# Patient Record
Sex: MALE | Race: WHITE | Employment: UNEMPLOYED | URBAN - METROPOLITAN AREA
[De-identification: names, ages, dates, MRNs, and addresses within clinical notes are randomized per-mention and may not be internally consistent; named-entity substitution may affect disease eponyms.]

---

## 2018-04-24 ENCOUNTER — OFFICE VISIT (OUTPATIENT)
Dept: FAMILY MEDICINE CLINIC | Facility: CLINIC | Age: 43
End: 2018-04-24

## 2018-04-24 VITALS
HEART RATE: 80 BPM | DIASTOLIC BLOOD PRESSURE: 82 MMHG | HEIGHT: 74 IN | BODY MASS INDEX: 26.82 KG/M2 | SYSTOLIC BLOOD PRESSURE: 138 MMHG | WEIGHT: 209 LBS | RESPIRATION RATE: 18 BRPM
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
## 2018-04-24 DIAGNOSIS — S06.0X0A CONCUSSION WITHOUT LOSS OF CONSCIOUSNESS, INITIAL ENCOUNTER: ICD-10-CM

## 2018-04-24 DIAGNOSIS — R11.0 NAUSEA: ICD-10-CM

## 2018-04-24 DIAGNOSIS — S39.012A STRAIN OF LUMBAR REGION, INITIAL ENCOUNTER: ICD-10-CM

## 2018-04-24 DIAGNOSIS — R07.89 LEFT-SIDED CHEST WALL PAIN: Primary | ICD-10-CM

## 2018-04-24 DIAGNOSIS — S16.1XXA STRAIN OF NECK MUSCLE, INITIAL ENCOUNTER: ICD-10-CM

## 2018-04-24 DIAGNOSIS — S29.019A STRAIN OF THORACIC REGION, INITIAL ENCOUNTER: ICD-10-CM

## 2018-04-24 PROCEDURE — 99214 OFFICE O/P EST MOD 30 MIN: CPT | Performed by: FAMILY MEDICINE

## 2018-04-24 RX ORDER — METHYLPREDNISOLONE 4 MG/1
TABLET ORAL
Qty: 1 KIT | Refills: 0 | Status: SHIPPED | OUTPATIENT
Start: 2018-04-24 | End: 2018-06-08 | Stop reason: ALTCHOICE

## 2018-04-24 RX ORDER — ORPHENADRINE CITRATE 100 MG/1
TABLET, EXTENDED RELEASE ORAL
Qty: 20 TABLET | Refills: 0 | Status: SHIPPED | OUTPATIENT
Start: 2018-04-24 | End: 2018-06-08 | Stop reason: ALTCHOICE

## 2018-04-24 RX ORDER — ONDANSETRON 4 MG/1
4 TABLET, FILM COATED ORAL EVERY 8 HOURS PRN
Qty: 20 TABLET | Refills: 0 | Status: SHIPPED | OUTPATIENT
Start: 2018-04-24 | End: 2018-06-08 | Stop reason: ALTCHOICE

## 2018-04-24 RX ORDER — IBUPROFEN 200 MG
600 TABLET ORAL EVERY 6 HOURS PRN
COMMUNITY
End: 2018-06-08

## 2018-05-07 ENCOUNTER — TELEPHONE (OUTPATIENT)
Dept: FAMILY MEDICINE CLINIC | Facility: CLINIC | Age: 43
End: 2018-05-07

## 2018-05-07 DIAGNOSIS — S39.012D STRAIN OF LUMBAR REGION, SUBSEQUENT ENCOUNTER: Primary | ICD-10-CM

## 2018-05-07 RX ORDER — HYDROCODONE BITARTRATE AND ACETAMINOPHEN 5; 325 MG/1; MG/1
TABLET ORAL
Refills: 0 | COMMUNITY
Start: 2018-05-01 | End: 2018-06-08

## 2018-05-07 NOTE — TELEPHONE ENCOUNTER
Pt called and said his back spasms got bad over the weekend and it brought him down to 1 knee and now his back is completely out. He wants to know if he can get an order for an open MRI.
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
## 2018-05-08 NOTE — TELEPHONE ENCOUNTER
MRI of lumbar spine ordered  Future Appointments  Date Time Provider Lauren Faulkner   5/14/2018 10:30 AM JACOBK MR [facility] [place]

## 2018-05-09 ENCOUNTER — TELEPHONE (OUTPATIENT)
Dept: FAMILY MEDICINE CLINIC | Facility: CLINIC | Age: 43
End: 2018-05-09

## 2018-05-14 ENCOUNTER — HOSPITAL ENCOUNTER (OUTPATIENT)
Dept: MRI IMAGING | Age: 43
Discharge: HOME | End: 2018-05-14
Attending: FAMILY MEDICINE
Payer: COMMERCIAL

## 2018-05-14 DIAGNOSIS — S39.012D STRAIN OF LUMBAR REGION, SUBSEQUENT ENCOUNTER: ICD-10-CM

## 2018-05-14 PROCEDURE — 72148 MRI LUMBAR SPINE W/O DYE: CPT | Performed by: FAMILY MEDICINE

## 2018-05-14 NOTE — PROGRESS NOTES
Moderate degenerative changes at L4-5 and L5-S1 with small disc protrusions with L5-S1 approaching the S1 nerve root, refer patient to pain clinic. Sent to my chart please call patient with pain clinic information.

## 2018-05-15 ENCOUNTER — TELEPHONE (OUTPATIENT)
Dept: FAMILY MEDICINE CLINIC | Facility: CLINIC | Age: 43
End: 2018-05-15

## 2018-05-15 DIAGNOSIS — S39.012D STRAIN OF LUMBAR REGION, SUBSEQUENT ENCOUNTER: ICD-10-CM

## 2018-05-15 DIAGNOSIS — R93.89 ABNORMAL FINDING ON RADIOLOGY EXAM: Primary | ICD-10-CM

## 2018-05-15 NOTE — TELEPHONE ENCOUNTER
I did see on MRI he has a herniated disc and nerve impugnment I believe L5 S1 I unfortunately can not find the MRI report that I wrote the instructions on. I was referring him to pain clinic. Tell him I am sorry he was suppose to be called today.  Also kathy

## 2018-05-15 NOTE — TELEPHONE ENCOUNTER
Pt called and wants to know if he needs to come in to talk to Pratima Kirk about his MRI results. He also wants to know if Pratima Kirk can write him a script for massage therapy in [facility] by his home.

## 2018-05-16 NOTE — TELEPHONE ENCOUNTER
----- Message from Adriel Wagoner PA-C sent at 5/14/2018  5:17 PM CDT -----  Moderate degenerative changes at L4-5 and L5-S1 with small disc protrusions with L5-S1 approaching the S1 nerve root, refer patient to pain clinic.   Sent to my chart please gustabo

## 2018-05-16 NOTE — TELEPHONE ENCOUNTER
The patient was informed of his results and was given contact information for Pain Medicine Specialist, Moni Abernathy M.D. Jerrod Eastman

## 2018-05-17 ENCOUNTER — TELEPHONE (OUTPATIENT)
Dept: SURGERY | Facility: CLINIC | Age: 43
End: 2018-05-17

## 2018-05-17 NOTE — TELEPHONE ENCOUNTER
Pt scheduled NP appt 5/22 with Dr Cheyenne Coles ,referred by Declan Donaldson for strain of the lumbar region;pt is using [employer] will call with current [employer] information

## 2018-05-18 ENCOUNTER — TELEPHONE (OUTPATIENT)
Dept: FAMILY MEDICINE CLINIC | Facility: CLINIC | Age: 43
End: 2018-05-18
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
## 2018-05-18 NOTE — TELEPHONE ENCOUNTER
Pt states he was referred to Dr. Bree Ledezma for pain management but they do not accept third party liability insurance, pt would like to know if we have any other pain management doctors he can call around and see if they accept third party liability.
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
## 2018-05-30 ENCOUNTER — TELEPHONE (OUTPATIENT)
Dept: FAMILY MEDICINE CLINIC | Facility: CLINIC | Age: 43
End: 2018-05-30

## 2018-05-30 DIAGNOSIS — R93.89 ABNORMAL FINDING ON RADIOLOGY EXAM: Primary | ICD-10-CM

## 2018-05-30 DIAGNOSIS — S39.012D STRAIN OF LUMBAR REGION, SUBSEQUENT ENCOUNTER: ICD-10-CM

## 2018-05-30 NOTE — TELEPHONE ENCOUNTER
Winston Kraus is calling to let Jair Giron know that he is having a hard time finding a pain clinic that will take him for a third party payor, or accept his [employer], he is in a lot of pain and would like to see someone asap, he would like to know if [employer]

## 2018-05-30 NOTE — TELEPHONE ENCOUNTER
Hemalatha    1)  [id number] Lizet Polanco for the pain clinic referral to [facility]? 2)  Pt is asking for a MDP-please advise?

## 2018-05-30 NOTE — TELEPHONE ENCOUNTER
Sanya Dinero is calling back and he did find a place that will take his insurance, it is [facility] pain clinic, and they are requesting a referral from Rhys Allen and all of Catalino's imaging that was done, MRI, Cat Scan, please fax to [facility] clinic [phone] or

## 2018-05-31 RX ORDER — METHYLPREDNISOLONE 4 MG/1
TABLET ORAL
Qty: 1 KIT | Refills: 0 | Status: SHIPPED | OUTPATIENT
Start: 2018-05-31 | End: 2018-06-08 | Stop reason: ALTCHOICE

## 2018-05-31 NOTE — TELEPHONE ENCOUNTER
Ok for medrol dose italo   Thanks,   Rhys Allen (Routing comment     Medrol dose pack sent to pharmacy  Referral to [facility] [address] [place] [address] pain clinic and  MRI lumbar spine report faxed to [phone]  Left message for patient that if [facility] wants the actual images fro

## 2018-06-08 ENCOUNTER — OFFICE VISIT (OUTPATIENT)
Dept: FAMILY MEDICINE CLINIC | Facility: CLINIC | Age: 43
End: 2018-06-08

## 2018-06-08 VITALS
SYSTOLIC BLOOD PRESSURE: 102 MMHG | WEIGHT: 206 LBS | DIASTOLIC BLOOD PRESSURE: 80 MMHG | BODY MASS INDEX: 26 KG/M2 | HEART RATE: 60 BPM

## 2018-06-08 DIAGNOSIS — R07.89 LEFT-SIDED CHEST WALL PAIN: ICD-10-CM

## 2018-06-08 DIAGNOSIS — M54.14 THORACIC RADICULOPATHY: ICD-10-CM

## 2018-06-08 DIAGNOSIS — M51.16 LUMBAR DISC DISEASE WITH RADICULOPATHY: Primary | ICD-10-CM

## 2018-06-08 PROCEDURE — 99214 OFFICE O/P EST MOD 30 MIN: CPT | Performed by: FAMILY MEDICINE

## 2018-06-08 RX ORDER — HYDROCODONE BITARTRATE AND ACETAMINOPHEN 5; 325 MG/1; MG/1
1 TABLET ORAL NIGHTLY PRN
Qty: 20 TABLET | Refills: 0 | Status: SHIPPED | OUTPATIENT
Start: 2018-06-08 | End: 2018-07-10 | Stop reason: ALTCHOICE

## 2018-06-08 RX ORDER — METHYLPREDNISOLONE 4 MG/1
TABLET ORAL
Qty: 1 KIT | Refills: 0 | Status: SHIPPED | OUTPATIENT
Start: 2018-06-08 | End: 2018-07-10 | Stop reason: ALTCHOICE

## 2018-06-08 RX ORDER — OMEGA-3 FATTY ACIDS/FISH OIL 300-1000MG
CAPSULE ORAL
COMMUNITY
End: 2021-02-10

## 2018-06-08 RX ORDER — ORPHENADRINE CITRATE 100 MG/1
TABLET, EXTENDED RELEASE ORAL
Qty: 30 TABLET | Refills: 1 | Status: SHIPPED | OUTPATIENT
Start: 2018-06-08 | End: 2018-07-10 | Stop reason: ALTCHOICE
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
## 2018-06-09 PROBLEM — M51.16 LUMBAR DISC DISEASE WITH RADICULOPATHY: Status: ACTIVE | Noted: 2018-06-09

## 2018-06-09 PROBLEM — R07.89 LEFT-SIDED CHEST WALL PAIN: Status: ACTIVE | Noted: 2018-06-09

## 2018-06-09 PROBLEM — M54.14 THORACIC RADICULOPATHY: Status: ACTIVE | Noted: 2018-06-09

## 2018-06-20 ENCOUNTER — TELEPHONE (OUTPATIENT)
Dept: FAMILY MEDICINE CLINIC | Facility: CLINIC | Age: 43
End: 2018-06-20

## 2018-06-20 NOTE — TELEPHONE ENCOUNTER
Sridevi Gibson is calling to see if Plummer Cassette would be willing to prescribe him something for his aggitation, he did discuss this with her at his last visit, please send to pharmacy on file.

## 2018-06-21 RX ORDER — ALPRAZOLAM 0.25 MG/1
TABLET ORAL
Qty: 15 TABLET | Refills: 0 | OUTPATIENT
Start: 2018-06-21 | End: 2021-02-10 | Stop reason: SINTOL

## 2018-06-21 NOTE — TELEPHONE ENCOUNTER
We can do short term for occasional use ie extreme anxiety reaction or daily. If he decides on daily we need to discuss the side effects. ie Zoloft , Prozac.   Give him Xanax 0.25 mg take 1 eery 8 hours as needed for panic/anxiety #15 do not to drive, operat

## 2018-06-21 NOTE — TELEPHONE ENCOUNTER
Alprazolam med called to pharmacy. Once approved can discard paper copy. lmom for pt that this med is for occasional use. To please call the office to schedule an appt to come in to discuss a med for more long term use.

## 2018-07-03 ENCOUNTER — HOSPITAL ENCOUNTER (OUTPATIENT)
Dept: MRI IMAGING | Age: 43
Discharge: HOME | End: 2018-07-03
Attending: FAMILY MEDICINE
Payer: COMMERCIAL

## 2018-07-03 DIAGNOSIS — M54.14 THORACIC RADICULOPATHY: ICD-10-CM

## 2018-07-03 DIAGNOSIS — R07.89 LEFT-SIDED CHEST WALL PAIN: ICD-10-CM

## 2018-07-03 PROCEDURE — 72146 MRI CHEST SPINE W/O DYE: CPT | Performed by: FAMILY MEDICINE

## 2018-07-07 NOTE — PROGRESS NOTES
Please call patient get update on how he is doing the thoracic MRI spine is normal no signs of nerve impingement. I believe he was consulting with a pain clinic specialist we can forward these results if needed.   Sent to my chart but also please get updat

## 2018-07-10 ENCOUNTER — OFFICE VISIT (OUTPATIENT)
Dept: FAMILY MEDICINE CLINIC | Facility: CLINIC | Age: 43
End: 2018-07-10

## 2018-07-10 VITALS
WEIGHT: 209 LBS | DIASTOLIC BLOOD PRESSURE: 82 MMHG | HEIGHT: 74 IN | HEART RATE: 80 BPM | BODY MASS INDEX: 26.82 KG/M2 | SYSTOLIC BLOOD PRESSURE: 124 MMHG

## 2018-07-10 DIAGNOSIS — S16.1XXA STRAIN OF NECK MUSCLE, INITIAL ENCOUNTER: ICD-10-CM

## 2018-07-10 DIAGNOSIS — R07.81 RIB PAIN ON LEFT SIDE: ICD-10-CM

## 2018-07-10 DIAGNOSIS — R07.89 LEFT-SIDED CHEST WALL PAIN: ICD-10-CM

## 2018-07-10 DIAGNOSIS — F41.1 ANXIETY REACTION: ICD-10-CM

## 2018-07-10 DIAGNOSIS — M51.26 BULGING LUMBAR DISC: ICD-10-CM

## 2018-07-10 DIAGNOSIS — M51.16 LUMBAR DISC DISEASE WITH RADICULOPATHY: Primary | ICD-10-CM

## 2018-07-10 PROCEDURE — 99214 OFFICE O/P EST MOD 30 MIN: CPT | Performed by: FAMILY MEDICINE

## 2018-07-10 RX ORDER — AMITRIPTYLINE HYDROCHLORIDE 10 MG/1
1 TABLET, FILM COATED ORAL NIGHTLY
Refills: 1 | COMMUNITY
Start: 2018-06-24 | End: 2018-08-21 | Stop reason: ALTCHOICE

## 2018-07-10 RX ORDER — HYDROCODONE BITARTRATE AND ACETAMINOPHEN 5; 325 MG/1; MG/1
1 TABLET ORAL NIGHTLY PRN
Qty: 20 TABLET | Refills: 0 | Status: SHIPPED | OUTPATIENT
Start: 2018-07-10 | End: 2018-08-21 | Stop reason: ALTCHOICE

## 2018-07-10 RX ORDER — MELOXICAM 15 MG/1
TABLET ORAL
Refills: 1 | COMMUNITY
Start: 2018-06-24 | End: 2018-08-21 | Stop reason: ALTCHOICE

## 2018-07-10 RX ORDER — METHYLPREDNISOLONE 4 MG/1
TABLET ORAL
Qty: 1 KIT | Refills: 0 | Status: SHIPPED | OUTPATIENT
Start: 2018-07-10 | End: 2018-08-21 | Stop reason: ALTCHOICE

## 2018-07-10 RX ORDER — CLONAZEPAM 0.5 MG/1
0.25 TABLET ORAL 2 TIMES DAILY PRN
Qty: 15 TABLET | Refills: 0 | Status: SHIPPED | OUTPATIENT
Start: 2018-07-10 | End: 2021-02-10 | Stop reason: ALTCHOICE
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
## 2018-08-21 ENCOUNTER — OFFICE VISIT (OUTPATIENT)
Dept: FAMILY MEDICINE CLINIC | Facility: CLINIC | Age: 43
End: 2018-08-21
Payer: COMMERCIAL

## 2018-08-21 VITALS
HEART RATE: 80 BPM | BODY MASS INDEX: 26.95 KG/M2 | HEIGHT: 74 IN | DIASTOLIC BLOOD PRESSURE: 78 MMHG | WEIGHT: 210 LBS | SYSTOLIC BLOOD PRESSURE: 110 MMHG

## 2018-08-21 DIAGNOSIS — R07.89 LEFT-SIDED CHEST WALL PAIN: ICD-10-CM

## 2018-08-21 DIAGNOSIS — M54.14 THORACIC RADICULOPATHY: Primary | ICD-10-CM

## 2018-08-21 DIAGNOSIS — M51.16 LUMBAR DISC DISEASE WITH RADICULOPATHY: ICD-10-CM

## 2018-08-21 PROCEDURE — 99213 OFFICE O/P EST LOW 20 MIN: CPT | Performed by: FAMILY MEDICINE
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
## 2018-08-30 ENCOUNTER — TELEPHONE (OUTPATIENT)
Dept: FAMILY MEDICINE CLINIC | Facility: CLINIC | Age: 43
End: 2018-08-30

## 2018-12-10 ENCOUNTER — TELEPHONE (OUTPATIENT)
Dept: FAMILY MEDICINE CLINIC | Facility: CLINIC | Age: 43
End: 2018-12-10

## 2018-12-10 NOTE — TELEPHONE ENCOUNTER
Fax [phone] Laura's fax number. She is calling and asking for the OV notes from 7/10/18 and 8/21/18 and needs to make sure the dx codes are included in these notes. Ok to fax?   She said that she has things faxed to her before regarding this pt

## 2021-02-10 ENCOUNTER — OFFICE VISIT (OUTPATIENT)
Dept: FAMILY MEDICINE CLINIC | Facility: CLINIC | Age: 46
End: 2021-02-10
Payer: MEDICAID

## 2021-02-10 VITALS
WEIGHT: 222 LBS | HEART RATE: 104 BPM | HEIGHT: 74 IN | SYSTOLIC BLOOD PRESSURE: 108 MMHG | BODY MASS INDEX: 28.49 KG/M2 | DIASTOLIC BLOOD PRESSURE: 88 MMHG | TEMPERATURE: 97 F

## 2021-02-10 DIAGNOSIS — H69.83 DYSFUNCTION OF BOTH EUSTACHIAN TUBES: Primary | ICD-10-CM

## 2021-02-10 DIAGNOSIS — R25.3 EYE MUSCLE TWITCHES: ICD-10-CM

## 2021-02-10 DIAGNOSIS — Z00.00 LABORATORY EXAMINATION ORDERED AS PART OF A ROUTINE GENERAL MEDICAL EXAMINATION: ICD-10-CM

## 2021-02-10 DIAGNOSIS — F41.8 SITUATIONAL ANXIETY: ICD-10-CM

## 2021-02-10 DIAGNOSIS — H81.13 BENIGN PAROXYSMAL POSITIONAL VERTIGO DUE TO BILATERAL VESTIBULAR DISORDER: ICD-10-CM

## 2021-02-10 DIAGNOSIS — Z01.84 IMMUNITY STATUS TESTING: ICD-10-CM

## 2021-02-10 DIAGNOSIS — B00.1 RECURRENT COLD SORES: ICD-10-CM

## 2021-02-10 DIAGNOSIS — K21.9 GASTROESOPHAGEAL REFLUX DISEASE WITHOUT ESOPHAGITIS: ICD-10-CM

## 2021-02-10 DIAGNOSIS — K59.01 SLOW TRANSIT CONSTIPATION: ICD-10-CM

## 2021-02-10 PROCEDURE — 3074F SYST BP LT 130 MM HG: CPT | Performed by: FAMILY MEDICINE

## 2021-02-10 PROCEDURE — 3008F BODY MASS INDEX DOCD: CPT | Performed by: FAMILY MEDICINE

## 2021-02-10 PROCEDURE — 3079F DIAST BP 80-89 MM HG: CPT | Performed by: FAMILY MEDICINE

## 2021-02-10 PROCEDURE — 99214 OFFICE O/P EST MOD 30 MIN: CPT | Performed by: FAMILY MEDICINE

## 2021-02-10 RX ORDER — VALACYCLOVIR HYDROCHLORIDE 1 G/1
TABLET, FILM COATED ORAL
Qty: 12 TABLET | Refills: 3 | Status: SHIPPED | OUTPATIENT
Start: 2021-02-10

## 2021-02-10 RX ORDER — FAMOTIDINE 40 MG/1
40 TABLET, FILM COATED ORAL 2 TIMES DAILY
Qty: 60 TABLET | Refills: 1 | Status: SHIPPED | OUTPATIENT
Start: 2021-02-10

## 2021-02-10 RX ORDER — ESOMEPRAZOLE MAGNESIUM 40 MG/1
40 CAPSULE, DELAYED RELEASE ORAL
Qty: 30 CAPSULE | Refills: 1 | Status: SHIPPED | OUTPATIENT
Start: 2021-02-10 | End: 2021-02-12

## 2021-02-10 RX ORDER — METHYLPREDNISOLONE 4 MG/1
TABLET ORAL
Qty: 1 KIT | Refills: 0 | Status: SHIPPED | OUTPATIENT
Start: 2021-02-10 | End: 2021-03-23

## 2021-02-10 RX ORDER — MECLIZINE HCL 12.5 MG/1
TABLET ORAL 3 TIMES DAILY PRN
Qty: 30 TABLET | Refills: 0 | Status: SHIPPED | OUTPATIENT
Start: 2021-02-10 | End: 2021-03-23

## 2021-02-10 RX ORDER — ONDANSETRON 4 MG/1
TABLET, FILM COATED ORAL EVERY 8 HOURS PRN
Qty: 20 TABLET | Refills: 0 | Status: SHIPPED | OUTPATIENT
Start: 2021-02-10 | End: 2021-07-19

## 2021-02-10 RX ORDER — ALPRAZOLAM 0.25 MG/1
TABLET ORAL
Qty: 15 TABLET | Refills: 0 | Status: SHIPPED | OUTPATIENT
Start: 2021-02-10

## 2021-02-10 NOTE — PROGRESS NOTES
Loida Tejada is a 41year old male. HPI:   Patient presents with:  Ear Problem: b/l ears clogged   Vertigo  Reflux  Weight Problem: bloating    Patient presents today with multiple questions and issues.     Vertigo with pressure in his ears for the past m methylPREDNISolone (MEDROL) 4 MG Oral Tablet Therapy Pack As directed.  1 kit 0   • Esomeprazole Magnesium 40 MG Oral Capsule Delayed Release Take 1 capsule (40 mg total) by mouth every morning before breakfast. 30 capsule 1   • famotidine 40 MG Oral Tab Ta (100.7 kg)   BMI 28.50 kg/m²   GENERAL: well developed, well nourished,in no apparent distress  SKIN: no rashes,no suspicious lesions no cold sores present today per patient  HEENT: TM clear bilaterally there is some fluid but no signs of infection.   Nose daily.   • Ondansetron HCl (ZOFRAN) 4 mg tablet 20 tablet 0     Sig: Take 1-2 tablets (4-8 mg total) by mouth every 8 (eight) hours as needed for Nausea.    • Meclizine HCl 12.5 MG Oral Tab 30 tablet 0     Sig: Take 1-2 tablets (12.5-25 mg total) by mouth 3 conservative use encouraged  Use, risks, benefits and precautions of alprazolam discussed. Including not to drive, operate machinery or drink alcohol when taking this medication.     6. Slow transit constipation  MiraLAX and Colace over-the-counter if neede

## 2021-02-11 ENCOUNTER — TELEPHONE (OUTPATIENT)
Dept: FAMILY MEDICINE CLINIC | Facility: CLINIC | Age: 46
End: 2021-02-11

## 2021-02-11 PROBLEM — R25.3 EYE MUSCLE TWITCHES: Status: ACTIVE | Noted: 2021-02-11

## 2021-02-11 PROBLEM — K21.9 GASTROESOPHAGEAL REFLUX DISEASE WITHOUT ESOPHAGITIS: Status: ACTIVE | Noted: 2021-02-11

## 2021-02-11 PROBLEM — H81.13 BENIGN PAROXYSMAL POSITIONAL VERTIGO DUE TO BILATERAL VESTIBULAR DISORDER: Status: ACTIVE | Noted: 2021-02-11

## 2021-02-11 PROBLEM — M54.14 THORACIC RADICULOPATHY: Status: RESOLVED | Noted: 2018-06-09 | Resolved: 2021-02-11

## 2021-02-11 PROBLEM — M51.16 LUMBAR DISC DISEASE WITH RADICULOPATHY: Status: RESOLVED | Noted: 2018-06-09 | Resolved: 2021-02-11

## 2021-02-11 PROBLEM — R07.89 LEFT-SIDED CHEST WALL PAIN: Status: RESOLVED | Noted: 2018-06-09 | Resolved: 2021-02-11

## 2021-02-11 PROBLEM — F41.8 SITUATIONAL ANXIETY: Status: ACTIVE | Noted: 2021-02-11

## 2021-02-11 PROBLEM — H69.93 DYSFUNCTION OF BOTH EUSTACHIAN TUBES: Status: ACTIVE | Noted: 2021-02-11

## 2021-02-11 PROBLEM — H69.83 DYSFUNCTION OF BOTH EUSTACHIAN TUBES: Status: ACTIVE | Noted: 2021-02-11

## 2021-02-11 PROBLEM — K59.01 SLOW TRANSIT CONSTIPATION: Status: ACTIVE | Noted: 2021-02-11

## 2021-02-11 NOTE — TELEPHONE ENCOUNTER
PA has been DENIED per health plan for Esomeprazole. Tracking Number [id number]  Cecilialasouleymane   [phone]    Patient does not meet criteria for coverage, drug in non preferred to plan.    Patient must have tried and failed to three of the pr

## 2021-02-11 NOTE — TELEPHONE ENCOUNTER
E40 MG Oral Capsule Delayed Release 30 capsule 1 2/10/2021    Sig:   Take 1 capsule (40 mg total) by mouth every morning before breakfast.     DX: K21.9    (Key: [id number])    Your information has been submitted to [employer].  [employer] is reviewing the

## 2021-02-12 RX ORDER — OMEPRAZOLE 40 MG/1
40 CAPSULE, DELAYED RELEASE ORAL DAILY
Qty: 30 CAPSULE | Refills: 1 | Status: SHIPPED | OUTPATIENT
Start: 2021-02-12

## 2021-03-23 DIAGNOSIS — H69.83 DYSFUNCTION OF BOTH EUSTACHIAN TUBES: ICD-10-CM

## 2021-03-23 DIAGNOSIS — H81.13 BENIGN PAROXYSMAL POSITIONAL VERTIGO DUE TO BILATERAL VESTIBULAR DISORDER: ICD-10-CM

## 2021-03-23 RX ORDER — MECLIZINE HCL 12.5 MG/1
TABLET ORAL 3 TIMES DAILY PRN
Qty: 30 TABLET | Refills: 0 | Status: SHIPPED | OUTPATIENT
Start: 2021-03-23 | End: 2021-07-19

## 2021-03-23 RX ORDER — METHYLPREDNISOLONE 4 MG/1
TABLET ORAL
Qty: 1 KIT | Refills: 0 | Status: SHIPPED | OUTPATIENT
Start: 2021-03-23 | End: 2021-07-19 | Stop reason: ALTCHOICE

## 2021-03-23 NOTE — TELEPHONE ENCOUNTER
Pt called requesting a refill on Meclizine 12. 5MG and the steroid pack he states the steroid pack helped best with his vertigo due to the liquid in his ear, pt knows he needs to set up appt with ENT but with his work schedule he has not been able to do so.

## 2021-03-23 NOTE — TELEPHONE ENCOUNTER
Patient called back. C/o vertigo that is interfering with daily life. C/o ear pressure. States last round the symptoms did improve but not go away.   Patient states he does not have time for appointments or phone calls and wants those prescriptions refil

## 2021-06-22 RX ORDER — OMEPRAZOLE 40 MG/1
CAPSULE, DELAYED RELEASE ORAL
Qty: 30 CAPSULE | Refills: 1 | OUTPATIENT
Start: 2021-06-22

## 2021-06-22 NOTE — TELEPHONE ENCOUNTER
Omeprazole 40 MG Oral Capsule Delayed Release 30 capsule 1 2/12/2021     No show to appt 4/28/21, 3/10/21  LOV 2/10/21  Follow up in 1 month     No future OV  Rx denied and [employer] message sent.

## 2021-07-01 ENCOUNTER — TELEPHONE (OUTPATIENT)
Dept: FAMILY MEDICINE CLINIC | Facility: CLINIC | Age: 46
End: 2021-07-01

## 2021-07-01 DIAGNOSIS — H69.83 DYSFUNCTION OF BOTH EUSTACHIAN TUBES: Primary | ICD-10-CM

## 2021-07-01 DIAGNOSIS — H81.13 BENIGN PAROXYSMAL POSITIONAL VERTIGO DUE TO BILATERAL VESTIBULAR DISORDER: ICD-10-CM

## 2021-07-01 NOTE — TELEPHONE ENCOUNTER
Spoke to patient. States his vertigo has returned. Also complains of constant \"clicking\" in his ears. Went to an IC and was prescribed meclizine and steroids. Per notes on 2/10/21:    1.  Dysfunction of both eustachian tubes  Take with food start li

## 2021-07-07 ENCOUNTER — TELEPHONE (OUTPATIENT)
Dept: FAMILY MEDICINE CLINIC | Facility: CLINIC | Age: 46
End: 2021-07-07

## 2021-07-07 NOTE — TELEPHONE ENCOUNTER
Pt called states he needs another referral to an ENT for his ear tubes, states Dr. Jessie Bailey does not take his ins. Advised pt to call ins and find out who takes his ins, but pt got upset and demanded a nurse to call back.

## 2021-07-19 ENCOUNTER — OFFICE VISIT (OUTPATIENT)
Dept: FAMILY MEDICINE CLINIC | Facility: CLINIC | Age: 46
End: 2021-07-19
Payer: MEDICAID

## 2021-07-19 ENCOUNTER — TELEPHONE (OUTPATIENT)
Dept: FAMILY MEDICINE CLINIC | Facility: CLINIC | Age: 46
End: 2021-07-19

## 2021-07-19 VITALS
DIASTOLIC BLOOD PRESSURE: 78 MMHG | SYSTOLIC BLOOD PRESSURE: 108 MMHG | BODY MASS INDEX: 28.11 KG/M2 | HEIGHT: 74 IN | TEMPERATURE: 98 F | WEIGHT: 219 LBS | HEART RATE: 84 BPM

## 2021-07-19 DIAGNOSIS — R11.0 NAUSEA: ICD-10-CM

## 2021-07-19 DIAGNOSIS — H69.83 DYSFUNCTION OF BOTH EUSTACHIAN TUBES: ICD-10-CM

## 2021-07-19 DIAGNOSIS — H81.13 BENIGN PAROXYSMAL POSITIONAL VERTIGO DUE TO BILATERAL VESTIBULAR DISORDER: Primary | ICD-10-CM

## 2021-07-19 DIAGNOSIS — Z00.00 LABORATORY EXAMINATION ORDERED AS PART OF A ROUTINE GENERAL MEDICAL EXAMINATION: ICD-10-CM

## 2021-07-19 PROCEDURE — 3074F SYST BP LT 130 MM HG: CPT | Performed by: FAMILY MEDICINE

## 2021-07-19 PROCEDURE — 3078F DIAST BP <80 MM HG: CPT | Performed by: FAMILY MEDICINE

## 2021-07-19 PROCEDURE — 3008F BODY MASS INDEX DOCD: CPT | Performed by: FAMILY MEDICINE

## 2021-07-19 PROCEDURE — 99215 OFFICE O/P EST HI 40 MIN: CPT | Performed by: FAMILY MEDICINE

## 2021-07-19 RX ORDER — METHYLPREDNISOLONE 4 MG/1
TABLET ORAL
Qty: 1 EACH | Refills: 0 | Status: SHIPPED | OUTPATIENT
Start: 2021-07-19 | End: 2021-10-04

## 2021-07-19 RX ORDER — TRIAMTERENE AND HYDROCHLOROTHIAZIDE 37.5; 25 MG/1; MG/1
1 CAPSULE ORAL EVERY MORNING
Qty: 30 CAPSULE | Refills: 0 | Status: SHIPPED | OUTPATIENT
Start: 2021-07-19 | End: 2021-08-30

## 2021-07-19 RX ORDER — MECLIZINE HCL 12.5 MG/1
TABLET ORAL 3 TIMES DAILY PRN
Qty: 30 TABLET | Refills: 1 | Status: SHIPPED | OUTPATIENT
Start: 2021-07-19 | End: 2021-10-04

## 2021-07-19 NOTE — TELEPHONE ENCOUNTER
Spoke to patient. States he did improve in the beginning of July with steroids and meclizine. Does continue to have vertigo, ear pressure, skull pressure, ear clicking. He is requesting antibiotics and antifungals.   Does have upcoming ENT appointment Au

## 2021-07-19 NOTE — PROGRESS NOTES
Jackie Hinojosa is a 41year old male. HPI:     Patient is in for follow-up on vertigo. States that he had an episode again of vertigo recently and had to be placed on a Medrol Dosepak and meclizine.   The issue of intermittent dizziness started 4 years ago or Nausea. 30 tablet 1   • Omeprazole 40 MG Oral Capsule Delayed Release Take 1 capsule (40 mg total) by mouth daily. 1/2 hr before brkfast (Patient taking differently: Take 40 mg by mouth daily.  1/2 hr before breakfast as needed ) 30 capsule 1   • famotid no signs of fluid or infection, nose no congestion, throat clear no erythema without mass.    EYES: PERRLA, EOM intact, sclera clear without injection  NECK: supple,no adenopathy, thyroid normal to palpation  LUNGS: clear to auscultation no rales, rhonchi o Ménière's disease online and knows with precautions to take we will further review differential with the ENT. During episode can use the meclizine and Medrol Dosepak. - Triamterene-HCTZ 37.5-25 MG Oral Cap; Take 1 capsule by mouth every morning.   Chelle Mckeon

## 2021-07-19 NOTE — TELEPHONE ENCOUNTER
Nicanor Ames is calling to see what he can do, his vertigo is bothering him again he does not want to wait a month to see Dev Peralta, and the vertigo that he has can give him a stroke, he would like to see what he can do.  Please call Catalino at [phone]

## 2021-08-28 DIAGNOSIS — H81.13 BENIGN PAROXYSMAL POSITIONAL VERTIGO DUE TO BILATERAL VESTIBULAR DISORDER: ICD-10-CM

## 2021-08-30 ENCOUNTER — TELEPHONE (OUTPATIENT)
Dept: FAMILY MEDICINE CLINIC | Facility: CLINIC | Age: 46
End: 2021-08-30

## 2021-08-30 RX ORDER — TRIAMTERENE AND HYDROCHLOROTHIAZIDE 37.5; 25 MG/1; MG/1
1 CAPSULE ORAL EVERY MORNING
Qty: 30 CAPSULE | Refills: 1 | Status: SHIPPED | OUTPATIENT
Start: 2021-08-30 | End: 2022-01-28

## 2021-08-30 NOTE — TELEPHONE ENCOUNTER
Triamterene-HCTZ 37.5-25 MG Oral Cap 30 capsule 0 7/19/2021    Sig:   Take 1 capsule by mouth every morning.        LOV 7/19/21  No FOV    Refill approved

## 2021-08-30 NOTE — TELEPHONE ENCOUNTER
Catalino needs a referral to [facility], he seen Dr Mini Colón and he would like [place] to go [place] [facility] [place] a Emily Polanco. He would like to know if a referral needs to be put in or who he can see.  Please call Catalino at [phone]

## 2021-08-31 NOTE — TELEPHONE ENCOUNTER
Spoke to [place]. States Dr. Azra Myrick would like for him to see neuro. He has not been contacted by neuro office. Advised to call Dr. Azra Myrick office and obtain name of neurologist to be sure he is covered by plan.   Advised that he should not need a referral b

## 2021-10-04 DIAGNOSIS — H69.83 DYSFUNCTION OF BOTH EUSTACHIAN TUBES: ICD-10-CM

## 2021-10-04 DIAGNOSIS — H81.13 BENIGN PAROXYSMAL POSITIONAL VERTIGO DUE TO BILATERAL VESTIBULAR DISORDER: ICD-10-CM

## 2021-10-04 RX ORDER — ONDANSETRON 4 MG/1
TABLET, FILM COATED ORAL EVERY 8 HOURS PRN
Qty: 20 TABLET | Refills: 0 | Status: SHIPPED | OUTPATIENT
Start: 2021-10-04

## 2021-10-04 RX ORDER — MECLIZINE HCL 12.5 MG/1
TABLET ORAL 3 TIMES DAILY PRN
Qty: 30 TABLET | Refills: 1 | Status: SHIPPED | OUTPATIENT
Start: 2021-10-04

## 2021-10-04 RX ORDER — METHYLPREDNISOLONE 4 MG/1
TABLET ORAL
Qty: 1 EACH | Refills: 0 | Status: SHIPPED | OUTPATIENT
Start: 2021-10-04

## 2021-10-04 NOTE — TELEPHONE ENCOUNTER
Abigail Kaur is calling to get a refill on his Meclizine HCl 12.5 MG Oral Tab, his nausea medication and he would like a steroid pac, he would like it sent to his  Home	[place] [address], Please call Abigail Kaur at [phone] with any questions.

## 2021-10-04 NOTE — TELEPHONE ENCOUNTER
Patient is requesting medrol dose pack, refill on meclizine, and zofran. Last fills was July for steriod and meclinze and zofran was February. He is having increased dizziness and nausea again.  Says it was better for a short time but over the last week it

## 2022-01-24 DIAGNOSIS — K21.9 GASTROESOPHAGEAL REFLUX DISEASE WITHOUT ESOPHAGITIS: ICD-10-CM

## 2022-01-25 RX ORDER — FAMOTIDINE 40 MG/1
TABLET, FILM COATED ORAL
Qty: 60 TABLET | Refills: 1 | OUTPATIENT
Start: 2022-01-25

## 2022-01-25 NOTE — TELEPHONE ENCOUNTER
Pt requesting refill of FAMOTIDINE 40MG TABLETS    Last Time Medication was Prescribed :  02/10/2021 qty 100 with 1 refill    Last Office Visit with Provider: 07/19/2021 for Vertigo    Recommended to return by Provider: The patient is asked to return in as

## 2022-01-28 DIAGNOSIS — H81.13 BENIGN PAROXYSMAL POSITIONAL VERTIGO DUE TO BILATERAL VESTIBULAR DISORDER: ICD-10-CM

## 2022-01-28 RX ORDER — TRIAMTERENE AND HYDROCHLOROTHIAZIDE 37.5; 25 MG/1; MG/1
1 CAPSULE ORAL EVERY MORNING
Qty: 30 CAPSULE | Refills: 0 | Status: SHIPPED | OUTPATIENT
Start: 2022-01-28 | End: 2023-06-15

## 2022-06-12 DIAGNOSIS — H81.13 BENIGN PAROXYSMAL POSITIONAL VERTIGO DUE TO BILATERAL VESTIBULAR DISORDER: ICD-10-CM

## 2022-06-13 RX ORDER — MECLIZINE HCL 12.5 MG/1
TABLET ORAL
Qty: 15 TABLET | Refills: 0 | Status: SHIPPED | OUTPATIENT
Start: 2022-06-13

## 2022-07-14 DIAGNOSIS — H81.13 BENIGN PAROXYSMAL POSITIONAL VERTIGO DUE TO BILATERAL VESTIBULAR DISORDER: ICD-10-CM

## 2022-07-14 DIAGNOSIS — K21.9 GASTROESOPHAGEAL REFLUX DISEASE WITHOUT ESOPHAGITIS: ICD-10-CM

## 2022-07-14 RX ORDER — MECLIZINE HCL 12.5 MG/1
TABLET ORAL
Qty: 15 TABLET | Refills: 0 | OUTPATIENT
Start: 2022-07-14

## 2022-07-14 RX ORDER — FAMOTIDINE 40 MG/1
TABLET, FILM COATED ORAL
Qty: 60 TABLET | Refills: 1 | OUTPATIENT
Start: 2022-07-14

## 2023-06-14 ENCOUNTER — OFFICE VISIT (OUTPATIENT)
Dept: FAMILY MEDICINE CLINIC | Facility: CLINIC | Age: 48
End: 2023-06-14
Payer: MEDICAID

## 2023-06-14 VITALS
OXYGEN SATURATION: 98 % | TEMPERATURE: 97 F | DIASTOLIC BLOOD PRESSURE: 64 MMHG | HEART RATE: 85 BPM | RESPIRATION RATE: 18 BRPM | BODY MASS INDEX: 27.83 KG/M2 | SYSTOLIC BLOOD PRESSURE: 128 MMHG | HEIGHT: 73 IN | WEIGHT: 210 LBS

## 2023-06-14 DIAGNOSIS — H81.13 BENIGN PAROXYSMAL POSITIONAL VERTIGO DUE TO BILATERAL VESTIBULAR DISORDER: ICD-10-CM

## 2023-06-14 DIAGNOSIS — Z12.5 SCREENING PSA (PROSTATE SPECIFIC ANTIGEN): ICD-10-CM

## 2023-06-14 DIAGNOSIS — Z83.71 FH: COLON POLYPS: ICD-10-CM

## 2023-06-14 DIAGNOSIS — Z13.29 SCREENING FOR ENDOCRINE, NUTRITIONAL, METABOLIC AND IMMUNITY DISORDER: ICD-10-CM

## 2023-06-14 DIAGNOSIS — Z13.220 ENCOUNTER FOR LIPID SCREENING FOR CARDIOVASCULAR DISEASE: ICD-10-CM

## 2023-06-14 DIAGNOSIS — F41.8 SITUATIONAL ANXIETY: ICD-10-CM

## 2023-06-14 DIAGNOSIS — K21.9 GASTROESOPHAGEAL REFLUX DISEASE WITHOUT ESOPHAGITIS: Primary | ICD-10-CM

## 2023-06-14 DIAGNOSIS — Z13.0 SCREENING FOR ENDOCRINE, NUTRITIONAL, METABOLIC AND IMMUNITY DISORDER: ICD-10-CM

## 2023-06-14 DIAGNOSIS — Z13.228 SCREENING FOR ENDOCRINE, NUTRITIONAL, METABOLIC AND IMMUNITY DISORDER: ICD-10-CM

## 2023-06-14 DIAGNOSIS — F41.0 PANIC ATTACKS: ICD-10-CM

## 2023-06-14 DIAGNOSIS — Z13.21 SCREENING FOR ENDOCRINE, NUTRITIONAL, METABOLIC AND IMMUNITY DISORDER: ICD-10-CM

## 2023-06-14 DIAGNOSIS — Z13.6 ENCOUNTER FOR LIPID SCREENING FOR CARDIOVASCULAR DISEASE: ICD-10-CM

## 2023-06-14 DIAGNOSIS — Z12.11 ENCOUNTER FOR SCREENING COLONOSCOPY: ICD-10-CM

## 2023-06-14 PROCEDURE — 3008F BODY MASS INDEX DOCD: CPT | Performed by: FAMILY MEDICINE

## 2023-06-14 PROCEDURE — 3078F DIAST BP <80 MM HG: CPT | Performed by: FAMILY MEDICINE

## 2023-06-14 PROCEDURE — 99215 OFFICE O/P EST HI 40 MIN: CPT | Performed by: FAMILY MEDICINE

## 2023-06-14 PROCEDURE — 3074F SYST BP LT 130 MM HG: CPT | Performed by: FAMILY MEDICINE

## 2023-06-14 RX ORDER — ONDANSETRON 4 MG/1
TABLET, FILM COATED ORAL EVERY 8 HOURS PRN
Qty: 20 TABLET | Refills: 0 | Status: SHIPPED | OUTPATIENT
Start: 2023-06-14

## 2023-06-14 RX ORDER — BUSPIRONE HYDROCHLORIDE 10 MG/1
TABLET ORAL
Qty: 60 TABLET | Refills: 2 | Status: SHIPPED | OUTPATIENT
Start: 2023-06-14

## 2023-06-14 RX ORDER — FLUTICASONE PROPIONATE 50 MCG
1 SPRAY, SUSPENSION (ML) NASAL DAILY
COMMUNITY
Start: 2021-12-06 | End: 2023-06-15

## 2023-06-14 RX ORDER — LORAZEPAM 0.5 MG/1
0.5 TABLET ORAL 2 TIMES DAILY PRN
Qty: 15 TABLET | Refills: 0 | Status: SHIPPED | OUTPATIENT
Start: 2023-06-14

## 2023-06-14 RX ORDER — ESCITALOPRAM OXALATE 5 MG/1
5 TABLET ORAL DAILY
COMMUNITY
Start: 2023-02-04 | End: 2023-06-15

## 2023-06-14 RX ORDER — FAMOTIDINE 40 MG/1
40 TABLET, FILM COATED ORAL EVERY MORNING
Qty: 30 TABLET | Refills: 1 | Status: SHIPPED | OUTPATIENT
Start: 2023-06-14

## 2023-06-15 PROBLEM — Z83.719 FH: COLON POLYPS: Status: ACTIVE | Noted: 2023-06-15

## 2023-06-15 PROBLEM — Z83.71 FH: COLON POLYPS: Status: ACTIVE | Noted: 2023-06-15

## 2023-09-21 ENCOUNTER — TELEPHONE (OUTPATIENT)
Dept: FAMILY MEDICINE CLINIC | Facility: CLINIC | Age: 48
End: 2023-09-21

## 2023-09-21 NOTE — TELEPHONE ENCOUNTER
URGENT SICK CALL    Catalino Merino  LOV: 6/14/2023     Patient experiencing sciatic back nerve pain. States that is has been going for the past 10 days. Patient is requesting a steroid pack be prescribed. Advised he would need to be seen for an appointment and offered Monday, 9/25 with Tim Trujillo PA-C. Per patient he didn't want to wait that long due to being in severe pain. Pt aware Governor Stands not in office today and not expecting return call until tomorrow. Please advise.

## 2024-03-07 ENCOUNTER — OFFICE VISIT (OUTPATIENT)
Dept: FAMILY MEDICINE CLINIC | Facility: CLINIC | Age: 49
End: 2024-03-07
Payer: MEDICAID

## 2024-03-07 VITALS
BODY MASS INDEX: 28.23 KG/M2 | RESPIRATION RATE: 16 BRPM | OXYGEN SATURATION: 97 % | HEART RATE: 79 BPM | TEMPERATURE: 98 F | HEIGHT: 74 IN | SYSTOLIC BLOOD PRESSURE: 122 MMHG | DIASTOLIC BLOOD PRESSURE: 84 MMHG | WEIGHT: 220 LBS

## 2024-03-07 DIAGNOSIS — Z13.29 SCREENING FOR ENDOCRINE, NUTRITIONAL, METABOLIC AND IMMUNITY DISORDER: ICD-10-CM

## 2024-03-07 DIAGNOSIS — J41.0 SIMPLE CHRONIC BRONCHITIS (HCC): ICD-10-CM

## 2024-03-07 DIAGNOSIS — E66.3 OVERWEIGHT (BMI 25.0-29.9): ICD-10-CM

## 2024-03-07 DIAGNOSIS — Z00.00 WELL ADULT EXAM: Primary | ICD-10-CM

## 2024-03-07 DIAGNOSIS — F41.1 GAD (GENERALIZED ANXIETY DISORDER): ICD-10-CM

## 2024-03-07 DIAGNOSIS — R63.5 WEIGHT GAIN: ICD-10-CM

## 2024-03-07 DIAGNOSIS — Z12.5 SCREENING PSA (PROSTATE SPECIFIC ANTIGEN): ICD-10-CM

## 2024-03-07 DIAGNOSIS — Z80.0 FH: STOMACH CANCER: ICD-10-CM

## 2024-03-07 DIAGNOSIS — Z13.21 SCREENING FOR ENDOCRINE, NUTRITIONAL, METABOLIC AND IMMUNITY DISORDER: ICD-10-CM

## 2024-03-07 DIAGNOSIS — Z13.228 SCREENING FOR ENDOCRINE, NUTRITIONAL, METABOLIC AND IMMUNITY DISORDER: ICD-10-CM

## 2024-03-07 DIAGNOSIS — Z13.6 SCREENING FOR HEART DISEASE: ICD-10-CM

## 2024-03-07 DIAGNOSIS — Z13.220 ENCOUNTER FOR LIPID SCREENING FOR CARDIOVASCULAR DISEASE: ICD-10-CM

## 2024-03-07 DIAGNOSIS — F10.10 ALCOHOL ABUSE: ICD-10-CM

## 2024-03-07 DIAGNOSIS — Z13.6 ENCOUNTER FOR LIPID SCREENING FOR CARDIOVASCULAR DISEASE: ICD-10-CM

## 2024-03-07 DIAGNOSIS — B00.1 RECURRENT COLD SORES: ICD-10-CM

## 2024-03-07 DIAGNOSIS — R19.8 PEPTIC ULCER SYMPTOMS: ICD-10-CM

## 2024-03-07 DIAGNOSIS — K21.9 GASTROESOPHAGEAL REFLUX DISEASE WITHOUT ESOPHAGITIS: ICD-10-CM

## 2024-03-07 DIAGNOSIS — Z13.0 SCREENING FOR ENDOCRINE, NUTRITIONAL, METABOLIC AND IMMUNITY DISORDER: ICD-10-CM

## 2024-03-07 DIAGNOSIS — Z12.11 COLON CANCER SCREENING: ICD-10-CM

## 2024-03-07 RX ORDER — VALACYCLOVIR HYDROCHLORIDE 1 G/1
TABLET, FILM COATED ORAL
Qty: 12 TABLET | Refills: 3 | Status: SHIPPED | OUTPATIENT
Start: 2024-03-07

## 2024-03-07 RX ORDER — ESOMEPRAZOLE MAGNESIUM 40 MG/1
40 CAPSULE, DELAYED RELEASE ORAL
Qty: 30 CAPSULE | Refills: 0 | Status: SHIPPED | OUTPATIENT
Start: 2024-03-07 | End: 2024-03-09

## 2024-03-07 RX ORDER — BUSPIRONE HYDROCHLORIDE 10 MG/1
TABLET ORAL
Qty: 60 TABLET | Refills: 3 | Status: SHIPPED | OUTPATIENT
Start: 2024-03-07 | End: 2024-03-09

## 2024-03-07 RX ORDER — ALBUTEROL SULFATE 90 UG/1
2 AEROSOL, METERED RESPIRATORY (INHALATION) EVERY 4 HOURS PRN
Qty: 1 EACH | Refills: 0 | Status: SHIPPED | OUTPATIENT
Start: 2024-03-07

## 2024-03-07 RX ORDER — BENZONATATE 200 MG/1
200 CAPSULE ORAL 3 TIMES DAILY PRN
Qty: 40 CAPSULE | Refills: 0 | Status: SHIPPED | OUTPATIENT
Start: 2024-03-07

## 2024-03-07 NOTE — PROGRESS NOTES
Catalino Merino is a 48 year old male who presents for a complete physical exam.   HPI:   Patient is in for complete physical also follow-up on anxiety, chronic bronchitis symptoms, attention issues, reflux/ulcer symptoms, cold sores      Problems:    Simple chronic bronchitis (HCC)  Patient states that he gets a recurrent cough that last for 3-5  months out of the year presently is in the third month of the cough was worse in January.  Presently no fevers, chills or bodyaches had been put on antibiotics in the past did stop smoking is asking for an inhaler and does find Tessalon Perles to work well.  Was given Tessalon Perles 1/15/2024 with a quick care, doxycycline for 10 days and prednisone 40 mg for 5 days  Cough, raspy  fatigue congestion and dizziness for years when he gets the bronchitis   Stopped smoking     MANDY (generalized anxiety disorder)  Ran out of BuSpar requests refill.  States Buspar is subtle helps to calm him down  Patient was taking BuSpar tolerates well states it does work great for him  Has significant attention issues very talkative gets distracted easily has not wanted to do anything but natural therapies for his mood but does feel the BuSpar is good.  Patient has a relationship with a partner who he feels very good with they are looking into eventually moving to [place] together.  Presently he is working at the family [employer] left his other job that was high stress does state that he enjoys working at the [employer] better than the other job.  Wants to stay on the BuSpar 5 to 10 mg twice a day as needed.  Patient denies any suicidal homicidal ideations  PHQ-9 is at a 3    Recurrent cold sores  Patient requests Valtrex refill gets intermittent cold sores and Valtrex does work well without any side effects    Gastroesophageal reflux disease without esophagitis  Peptic ulcer symptoms  FH: stomach cancer  Dad stomach cancer dad liver transplant alcohol;  Brother stomach cancer   Patient states that he  has had midepigastric discomfort used to proton pump inhibitor in the past requesting refill from 2021 prescription.  Gets midepigastric discomfort tries to avoid nonsteroidals, has occasional caffeine did discontinue nicotine but does have occasional alcohol which she is trying to bring down.  With family history of stomach cancer would need endoscopy has never had  one    Overweight (BMI 25.0-29.9)  Weight gain  Patient is concerned about weight gain wants cortisol levels checked states his diet is good but does drink alcohol intermittently tries to reduce the alcohol intake so has been taking breaks between 10 to 20 days off of alcohol.      --- Complete physical exam  ADHD/MANDY no counseling done not considering counseling or treatment besides BuSpar at this time  Alc use varies is now less choice to go several days has been up to 10 to 15 days without drinking but does binge.  Family history of alcoholism admits to personal history of alcoholism not doing any counseling  UFCT never   Immunization patient defers all immunizations COVID vaccine  deferred; Tdap  deferred  Dental exams been years needed  Eye exams UTD  PHQ9 is at a 3   Sleep Apnea has been some snoring less with stopping the drinking   Exercise   is going to start walking and hiking   DIet  better diet   Smoking history stopped smoking, no nicotine vape  for 70 days started smoking at 19 years old social at 1 pack/week.   Alcohol history  not at home casual social tries to skip days up to 10 trying to stop binge eating   Admits to alcoholism    FH CAD or cancer Dad stomach cancer dad liver transplant alcohol; Mat aunt lung cancer, Brother stomach cancer.  Mom history of carotid endarterectomy and HTN    Last colonoscopy Never has no colon cancer in FH.bright red blood constipation is due for colonoscopy,   Urination changes stream is good;  nocturia 0 no BPH symptoms good flow no family history of prostate cancer      Wt Readings from Last 6  Encounters:   03/07/24 220 lb (99.8 kg)   06/14/23 210 lb (95.3 kg)   07/19/21 219 lb (99.3 kg)   02/10/21 222 lb (100.7 kg)   08/21/18 210 lb (95.3 kg)   07/10/18 209 lb (94.8 kg)     Body mass index is 28.25 kg/m².     Results for orders placed or performed in visit on 04/19/18   Comp Metabolic Panel (14)   Result Value Ref Range    Glucose 88 70 - 99 mg/dL    Blood Urea Nitrogen 17 8 - 20 mg/dL    Creatinine 1.47 (H) 0.70 - 1.30 mg/dL    Sodium 140 136 - 144 mmol/L    Potassium 4.3 3.6 - 5.1 mmol/L    Chloride 106 101 - 111 mmol/L    Carbon Dioxide 27.0 22.0 - 32.0 mmol/L    Calcium 9.1 8.3 - 10.3 mg/dL    Total Protein 7.6 6.1 - 8.3 g/dL    Albumin 4.2 3.5 - 4.8 g/dL    Bilirubin, Total 0.49 0.10 - 2.00 mg/dL    Alkaline Phosphatase 45 45 - 117 U/L    AST 25 15 - 41 U/L    ALT 50 17 - 63 U/L    GFR CKD-EPI 58.42 (L) >=60.00 mL/min/1.73 m²   Prothrombin Time (PT)   Result Value Ref Range    Prothrombin Time 9.9 8.9 - 11.9 sec    INR 1.0 0.9 - 1.2 ratio   CBC W/ DIFFERENTIAL   Result Value Ref Range    WBC 9.04 4.00 - 13.00 10^3/uL    RBC 5.35 4.30 - 5.70 10*6/uL    Hemoglobin 15.9 13.0 - 17.0 g/dL    Hematocrit 46.0 37.0 - 53.0 %    MCV 86.0 80.0 - 99.0 fL    MCH 29.7 27.0 - 33.2 pg    MCHC 34.6 31.0 - 37.0 g/dL    Platelet Count 255 150 - 450 10*3/uL    RDW 12.6 11.5 - 16.0 %    MPV 10.2 7.0 - 11.5 fL    Neutrophils Absolute 5.99 1.30 - 6.70 10ˆ3/µL    Lymphocytes Absolute 1.97 0.90 - 4.00 10*3/uL    Monocytes Absolute 0.83 (H) 0.10 - 0.60 10*3/uL    Eosinophils Absolute 0.22 0.00 - 0.30 10*3/uL    Basophils Absolute 0.03 0.00 - 0.10 10*3/uL    nRBC Absolute 0.000 0.000 - 0.012 10*3/uL    Neutrophils % 66.3 %    Lymphocytes % 21.8 %    Monocytes % 9.2 %    Eosinophils % 2.4 %    Basophils % 0.3 %    nRBC/100 WBC 0.00 /100WBC       Current Outpatient Medications   Medication Sig Dispense Refill    Beclomethasone Diprop HFA 80 MCG/ACT Inhalation Aerosol, Breath Activated Inhale 2 Inhalations into the lungs in the  morning and 2 Inhalations before bedtime. Rinse mouth after use.      benzonatate 200 MG Oral Cap Take 1 capsule (200 mg total) by mouth 3 (three) times daily as needed for cough. 40 capsule 0    albuterol 108 (90 Base) MCG/ACT Inhalation Aero Soln Inhale 2 puffs into the lungs every 4 (four) hours as needed for Wheezing or Shortness of Breath (or cough). 1 each 0    busPIRone 10 MG Oral Tab 5-10 mg twice a day for anxiety as needed 60 tablet 3    Esomeprazole Magnesium 40 MG Oral Capsule Delayed Release Take 1 capsule (40 mg total) by mouth every morning before breakfast. For peptic ulcer symptoms 30 capsule 0    valACYclovir (VALTREX) 1 G Oral Tab 2 grams twice day for 1 day 12 tablet 3    Beclomethasone Diprop HFA 80 MCG/ACT Inhalation Aerosol, Breath Activated Inhale 2 Inhalers into the lungs in the morning and 2 Inhalers before bedtime. Rinse mouth after use. 10.6 g 3      Past Medical History:   Diagnosis Date    Lumbar disc disease with radiculopathy 2018      Past Surgical History:   Procedure Laterality Date    APPENDECTOMY   or     [facility]      Family History   Problem Relation Age of Onset    Ear Problems Father     Ear Problems Paternal Uncle     No Known Problems Son     No Known Problems Son       Social History:  Social History     Socioeconomic History    Marital status:    Tobacco Use    Smoking status: Former     Packs/day: 0.25     Years: 10.00     Additional pack years: 0.00     Total pack years: 2.50     Types: Cigarettes     Quit date: 10/1/2018     Years since quittin.4    Smokeless tobacco: Never   Vaping Use    Vaping Use: Never used   Substance and Sexual Activity    Alcohol use: Yes     Alcohol/week: 2.0 - 5.0 standard drinks of alcohol     Types: 2 - 5 Standard drinks or equivalent per week    Drug use: No   Other Topics Concern     Service No    Blood Transfusions No    Caffeine Concern Yes     Comment: 3 coffees or teas weekly    Occupational  Exposure No    Hobby Hazards No    Sleep Concern Yes    Stress Concern Yes    Weight Concern Yes    Special Diet No    Back Care No    Exercise No    Bike Helmet No    Seat Belt Yes    Self-Exams No      Occ: Whole foods . : no. Children: 2.   Exercise: minimal.  Diet: watches minimally     REVIEW OF SYSTEMS:   GENERAL: feels well overall, denies fever or chills, had weight gain   SKIN: denies any unusual skin lesions  EYES: denies changes in vision  HENT: denies upper respiratory symptoms  LUNGS: denies CRISTOPHER, wheezing, cough, orthopnea and PND chronic cough as above  CARDIOVASCULAR: denies CP, palpitations, rapid or slow heart rate. Denies JULIEN/lower extremity swelling  GI: denies abdominal pain,denies heartburn, denies n/v/c/d/change in stools/blood in stool/black stool/change in appetite  : denies nocturia or changes in urinary stream, denies scrotal mass/abnormal discharge from urethra  MUSCULOSKELETAL: denies joint or muscle aches or pains  NEURO: denies headaches/dizziness  PSYCH: Anxiety see above  HEMATOLOGIC: denies easy bruising/bleeding/anemia/blood clot disorders  ENDOCRINE: denies weight gain/weight loss/enlargement of neck glands/polyuria/polydypsia  ALL/ASTHMA: denies allergic rhinitis/asthma    EXAM:   /84   Pulse 79   Temp 97.8 °F (36.6 °C) (Temporal)   Resp 16   Ht 6' 2\" (1.88 m)   Wt 220 lb (99.8 kg)   SpO2 97%   BMI 28.25 kg/m²   Body mass index is 28.25 kg/m².   GENERAL: NAD, pleasant, well developed, normal voice  SKIN: no rashes, no suspicious moles or lesions  HENT: NCAT, EACs clear b/l, TMs normal b/l, nasal turbinatess normal b/l, oropharynx with mmm/clear/normal, gingiva normal, lips normal  EYES: PERRLA, EOMI, conjunctiva non-injected and non-icteric  NECK: supple, no adenopathy/thyromegaly/thyroid nodules/masses  CHEST: no chest tenderness  BREAST: no suspicious masses appreciated on palpation  LUNGS: Periodic dry cough CTA A/P, no wheezes/ronchi/rales/crackles,  normal air excursion  CARDIOVASCULAR: RRR, no murmur, no lower extremity edema, pedal and femoral pulses 2+ and symmetric b/l  GI: normoactive BS, non-distended, mild mid epigastric tenderness without guarding, rigidity or rebound, no HSM/masses/pulsations  : Patient defers genital exam  MUSCULOSKELETAL: Back with normal AROM, no joint swelling, extremities x 4 with normal strength 5/5 and symmetric and with normal AROM/PROM.   EXTREMITIES: no cyanosis, clubbing or edema  NEURO: A&O x3, CN II-XII grossly intact. Reflexes: biceps and patellar 2+ b/l and symmetric. Gait is normal.  PSYCH: normal affect, no apparent thought disorder, average judgement and insight      There is no immunization history on file for this patient.    ASSESSMENT AND PLAN:   Catalino Merino is a 48 year old male who presents for a complete physical exam.   Encounter Diagnoses   Name Primary?    Well adult exam Yes    Simple chronic bronchitis (HCC)     MANDY (generalized anxiety disorder)     Recurrent cold sores     Gastroesophageal reflux disease without esophagitis     Peptic ulcer symptoms     Overweight (BMI 25.0-29.9)     FH: stomach cancer     Weight gain     Encounter for lipid screening for cardiovascular disease     Screening for endocrine, nutritional, metabolic and immunity disorder     Screening PSA (prostate specific antigen)     Colon cancer screening     Screening for heart disease        Orders Placed This Encounter   Procedures    Comp Metabolic Panel (14) [E]    CBC W Differential W Platelet [E]    TSH W Reflex To Free T4 [E]    Lipid Panel [E]    Occult Blood, Fecal, Immunoassay (Blue cards) [E]    PSA Total, Screen    Cortisol [E]       Meds & Refills for this Visit:  Requested Prescriptions     Signed Prescriptions Disp Refills    benzonatate 200 MG Oral Cap 40 capsule 0     Sig: Take 1 capsule (200 mg total) by mouth 3 (three) times daily as needed for cough.    albuterol 108 (90 Base) MCG/ACT Inhalation Aero Soln 1 each  0     Sig: Inhale 2 puffs into the lungs every 4 (four) hours as needed for Wheezing or Shortness of Breath (or cough).    busPIRone 10 MG Oral Tab 60 tablet 3     Si-10 mg twice a day for anxiety as needed    Esomeprazole Magnesium 40 MG Oral Capsule Delayed Release 30 capsule 0     Sig: Take 1 capsule (40 mg total) by mouth every morning before breakfast. For peptic ulcer symptoms    valACYclovir (VALTREX) 1 G Oral Tab 12 tablet 3     Si grams twice day for 1 day       Imaging & Consults:  GASTRO - INTERNAL  CT CALCIUM SCORING  XR CHEST PA + LAT CHEST (CPT=71046)  1. Well adult exam  Recommend healthy diet including green leafy vegetables, fresh fruits and lean meats.  Aerobic exercise 30 minutes five days a week for cardiovascular fitness and 45-60 minutes 6-7 days a week for weight loss. Advised testicular self exam once a month.    2. Simple chronic bronchitis (HCC)  Reviewed medication benefits and side effects.   Follow-up if symptoms do not improve with steroid inhaler instructed how to use inhalers.  - XR CHEST PA + LAT CHEST (CPT=71046); Future  - benzonatate 200 MG Oral Cap; Take 1 capsule (200 mg total) by mouth 3 (three) times daily as needed for cough.  Dispense: 40 capsule; Refill: 0  - albuterol 108 (90 Base) MCG/ACT Inhalation Aero Soln; Inhale 2 puffs into the lungs every 4 (four) hours as needed for Wheezing or Shortness of Breath (or cough).  Dispense: 1 each; Refill: 0  - Beclomethasone Diprop HFA 80 MCG/ACT Inhalation Aerosol, Breath Activated; Inhale 2 Inhalations into the lungs in the morning and 2 Inhalations before bedtime. Rinse mouth after use.    3. MANDY (generalized anxiety disorder)  Alcohol abuse  Continue with BuSpar 5 to 10 mg twice a day as needed for anxiety has no side effects and does feel better when he takes BuSpar  Encouraged counseling and alcohol cessation    4. Recurrent cold sores  Reviewed medication benefits and side effects.     - valACYclovir (VALTREX) 1 G Oral  Tab; 2 grams twice day for 1 day  Dispense: 12 tablet; Refill: 3    5. Gastroesophageal reflux disease without esophagitis  GERD prevention reviewed avoid caffeine, alcohol, and nonstnoeroidals.  Eat small frequent meals and avoid eating 3-4 hours before bedtime, try to raise the head of bed.  Needs endoscopy schedule for endoscopy and colonoscopy  1 month of PPI provided for gastric symptoms  - Esomeprazole Magnesium 40 MG Oral Capsule Delayed Release; Take 1 capsule (40 mg total) by mouth every morning before breakfast. For peptic ulcer symptoms  Dispense: 30 capsule; Refill: 0  - Gastro Referral - In Network    6. Peptic ulcer symptoms  Avoid alcohol, caffeine and nonsteroidals  - Esomeprazole Magnesium 40 MG Oral Capsule Delayed Release; Take 1 capsule (40 mg total) by mouth every morning before breakfast. For peptic ulcer symptoms  Dispense: 30 capsule; Refill: 0  - Gastro Referral - In Network    7. Overweight (BMI 25.0-29.9)  Weight loss discussed patient should start exercising daily for 30-40 minutes also reducing all carbohydrates both simple and complex.  Can eat fruits and vegetables and small frequent meals of healthy combinations of protein and carbohydrate.  Avoiding packaged/processed.   Discussed importance of alcohol cessation  - Cortisol [E]; Future    8. FH: stomach cancer  Endoscopy needed  - Gastro Referral - In Network    9. Weight gain  See above weight loss information  - Cortisol [E]; Future    10. Encounter for lipid screening for cardiovascular disease  - Lipid Panel [E]; Future    11. Screening for endocrine, nutritional, metabolic and immunity disorder  - Comp Metabolic Panel (14) [E]; Future  - CBC W Differential W Platelet [E]; Future  - TSH W Reflex To Free T4 [E]; Future    12. Screening PSA (prostate specific antigen)  - PSA Total, Screen; Future    13. Colon cancer screening  - Occult Blood, Fecal, Immunoassay (Blue cards) [E]; Future    14. Screening for heart disease  - CT  CALCIUM SCORING; Future  Time spent was 60 minutes more than 50% was spent on counseling regarding medical conditions and treatment.  Rest of time was spent reviewing chart, reviewing blood work and radiology tests.     The patient verbalizes understanding of these recommendations and agrees to the plan.  The patient is asked to return in 3 months for follow-up  also recommended follow-up visits more consistently due to multiple issues he brought up during the office visit further instructions may be needed.

## 2024-03-08 ENCOUNTER — TELEPHONE (OUTPATIENT)
Dept: FAMILY MEDICINE CLINIC | Facility: CLINIC | Age: 49
End: 2024-03-08

## 2024-03-08 DIAGNOSIS — F41.1 GAD (GENERALIZED ANXIETY DISORDER): ICD-10-CM

## 2024-03-08 DIAGNOSIS — J41.0 SIMPLE CHRONIC BRONCHITIS (HCC): ICD-10-CM

## 2024-03-08 PROBLEM — F41.8 SITUATIONAL ANXIETY: Status: RESOLVED | Noted: 2021-02-11 | Resolved: 2024-03-08

## 2024-03-08 PROBLEM — E66.3 OVERWEIGHT (BMI 25.0-29.9): Status: ACTIVE | Noted: 2024-03-08

## 2024-03-08 PROBLEM — Z80.0 FH: STOMACH CANCER: Status: ACTIVE | Noted: 2024-03-08

## 2024-03-08 PROBLEM — R25.3 EYE MUSCLE TWITCHES: Status: RESOLVED | Noted: 2021-02-11 | Resolved: 2024-03-08

## 2024-03-08 PROBLEM — R41.840 ATTENTION DEFICIT: Status: ACTIVE | Noted: 2024-03-08

## 2024-03-08 PROBLEM — R19.8 PEPTIC ULCER SYMPTOMS: Status: ACTIVE | Noted: 2024-03-08

## 2024-03-08 NOTE — TELEPHONE ENCOUNTER
PA for esomeprazole denied. Must have tried lansoprazole and pantoprazole. They both have a limit of 120 days supply in 365 days as well. Please advise, thanks.

## 2024-03-08 NOTE — TELEPHONE ENCOUNTER
Catalino Merino requesting medication refill for albuterol 108 (90 Base) MCG/ACT Inhalation Aero Soln   .busPIRone 10 MG Oral Tab   Esomeprazole Magnesium 40 MG Oral Capsule Delayed Release     LOV: 3/7/2024   Last Refill Date:   30 or 90 Day Supply:  Pharmacy Location: [facility] #[id number] [place] [address] AT [facility] [address], [phone], [phone] [[id number]]   Prescribed By: Wali     Patient never received medications. Requesting refills   Informed patient to allow up to 24 to 48 Business hours for a call back from a nurse.

## 2024-03-08 NOTE — TELEPHONE ENCOUNTER
Spoke with Yousuf at the pharmacy. Pt picked up the albuterol inhaler yesterday 3/7. It is too soon to fill buspirone-3/16. Esomeprazole has PA pending.  Pt has PA pending for beclomethasone as well. Maybe this is the inhaler he is referring to?  Called to discuss w/ pt. Phone rang, never went to .

## 2024-03-08 NOTE — TELEPHONE ENCOUNTER
Pt calling back to speak with nurse and was advised we are awaiting response from Hemalatha Keyes PA-C on next steps. Pt was informed It is too soon to fill buspirone-3/16 per nurse's note below.   Pt also informed per nurse's note below:    PA for esomeprazole denied. Must have tried lansoprazole and pantoprazole. They both have a limit of 120 days supply in 365 days as well.     Pt voiced understanding and ok with any new changes. Please call pt back once Hemalatha has advised.    Thank you.

## 2024-03-09 ENCOUNTER — PATIENT MESSAGE (OUTPATIENT)
Dept: FAMILY MEDICINE CLINIC | Facility: CLINIC | Age: 49
End: 2024-03-09

## 2024-03-09 DIAGNOSIS — J41.0 SIMPLE CHRONIC BRONCHITIS (HCC): Primary | ICD-10-CM

## 2024-03-09 RX ORDER — PANTOPRAZOLE SODIUM 40 MG/1
TABLET, DELAYED RELEASE ORAL
Qty: 30 TABLET | Refills: 0 | Status: SHIPPED | OUTPATIENT
Start: 2024-03-09

## 2024-03-09 RX ORDER — BUSPIRONE HYDROCHLORIDE 10 MG/1
TABLET ORAL
Qty: 60 TABLET | Refills: 3 | Status: SHIPPED | OUTPATIENT
Start: 2024-03-09

## 2024-03-09 NOTE — TELEPHONE ENCOUNTER
Sent my chart message to patient reset Buspar, sent Protonix and the Q aline is needed prior authorization.

## 2024-03-11 ENCOUNTER — TELEPHONE (OUTPATIENT)
Dept: FAMILY MEDICINE CLINIC | Facility: CLINIC | Age: 49
End: 2024-03-11

## 2024-03-11 DIAGNOSIS — J41.0 SIMPLE CHRONIC BRONCHITIS (HCC): ICD-10-CM

## 2024-03-11 NOTE — TELEPHONE ENCOUNTER
JUNAIDI the PA request for Beclomethasone Diprop HFA 80 MCG/ACT Inhalation Aerosol, Breath Activated was denied

## 2024-03-11 NOTE — TELEPHONE ENCOUNTER
Was there alternatives for steroid inhaler provided from Medicaid so I can give him a different one?

## 2024-03-14 ENCOUNTER — TELEPHONE (OUTPATIENT)
Dept: FAMILY MEDICINE CLINIC | Facility: CLINIC | Age: 49
End: 2024-03-14

## 2024-03-14 NOTE — TELEPHONE ENCOUNTER
Details of this decision are provided on the physician outcome notice which has been faxed to the number on file.   Case ID: [id number]      Payer: [facility]    [phone]   Electronic appeal: Not supported   View History       Mometasone was denied.  Was there any further med change for pt?

## 2024-03-14 NOTE — TELEPHONE ENCOUNTER
Let patient know his insurance is not covering the steroid inhalers (I tried 2 inhalers) see it he can contact insurance to see if they cover any steroid inhalers.

## 2024-03-24 DIAGNOSIS — F41.1 GAD (GENERALIZED ANXIETY DISORDER): ICD-10-CM

## 2024-03-25 RX ORDER — BUSPIRONE HYDROCHLORIDE 10 MG/1
TABLET ORAL
Qty: 60 TABLET | Refills: 1 | Status: SHIPPED | OUTPATIENT
Start: 2024-03-25
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
## 2024-06-10 ENCOUNTER — TELEPHONE (OUTPATIENT)
Dept: FAMILY MEDICINE CLINIC | Facility: CLINIC | Age: 49
End: 2024-06-10

## 2024-06-10 NOTE — TELEPHONE ENCOUNTER
Catalino Wilber was scheduled for an appt on 6/10/2024 with a visit reason of 2 month follow up .    Letter/MyChart message sent to pt notifying of missed appointment with $40 no show fee.

## 2024-09-16 DIAGNOSIS — K21.9 GASTROESOPHAGEAL REFLUX DISEASE WITHOUT ESOPHAGITIS: ICD-10-CM

## 2024-09-16 RX ORDER — FAMOTIDINE 40 MG/1
40 TABLET, FILM COATED ORAL EVERY MORNING
Qty: 30 TABLET | Refills: 1 | OUTPATIENT
Start: 2024-09-16

## 2024-09-16 NOTE — TELEPHONE ENCOUNTER
The original prescription was discontinued on 3/7/2024 by Nilsa Domínguez CMA for the following reason: Therapy completed.     Requested Prescriptions     Refused Prescriptions Disp Refills    FAMOTIDINE 40 MG Oral Tab [Pharmacy Med Name: FAMOTIDINE 40MG TABLETS] 30 tablet 1     Sig: TAKE 1 TABLET(40 MG) BY MOUTH EVERY MORNING     Refused By: BON DAVIS     Reason for Refusal: Refill not appropriate

## 2024-10-03 ENCOUNTER — TELEPHONE (OUTPATIENT)
Dept: FAMILY MEDICINE CLINIC | Facility: CLINIC | Age: 49
End: 2024-10-03

## 2024-10-03 DIAGNOSIS — Z12.11 SCREENING FOR COLON CANCER: Primary | ICD-10-CM

## 2024-10-03 DIAGNOSIS — J41.0 SIMPLE CHRONIC BRONCHITIS (HCC): ICD-10-CM

## 2024-10-03 DIAGNOSIS — H69.93 DYSFUNCTION OF BOTH EUSTACHIAN TUBES: ICD-10-CM

## 2024-10-03 RX ORDER — BENZONATATE 200 MG/1
200 CAPSULE ORAL 3 TIMES DAILY PRN
Qty: 40 CAPSULE | Refills: 0 | Status: SHIPPED | OUTPATIENT
Start: 2024-10-03 | End: 2024-10-07

## 2024-10-03 RX ORDER — METHYLPREDNISOLONE 4 MG
TABLET, DOSE PACK ORAL
Qty: 1 EACH | Refills: 0 | Status: CANCELLED | OUTPATIENT
Start: 2024-10-03

## 2024-10-03 NOTE — TELEPHONE ENCOUNTER
Patient agreeable to going to [facility] for oral steroids. Patient requests benzonatate and mometasone inhaler  Please review

## 2024-10-03 NOTE — TELEPHONE ENCOUNTER
With a family history of colon cancer he needs a GI consultation to discuss.  The only recommendation I have for strong family history of colon cancer is referring to genetic specialist for genetic testing and to consult with a GI for colonoscopy.  Other screening tests are not indicated due to the family history of colon cancer.    --- Refer to gastroenterologist  --- Refer to genetic specialist

## 2024-10-03 NOTE — TELEPHONE ENCOUNTER
Spoke with patient. He will hold off on the genetic counselor for now. Ok for referral to GI.   Referral placed.

## 2024-10-03 NOTE — TELEPHONE ENCOUNTER
I can give him the Tessalon Perles and the steroid inhaler but not the oral steroids if he needs oral steroids he would need to be seen at a quick care.

## 2024-10-03 NOTE — TELEPHONE ENCOUNTER
Patient calling to schedule appointment with Hemalatha Keyes PA-C to review test results that patient will be completing on 10/19/24. Hemalatha Keyes PA-C first available appointment is:  Future Appointments   Date Time Provider Department Center   12/10/2024 12:30 PM Hemalatha Keyes PA-C [facility] [place]     Patient requesting order for colonoscopy due to father just diagnosed with stage 4 rectal cancer and multiple uncles have  from this cancer.     Patient would also like a recommendation for something other than Endoscopy. Patient feels this is to invasive and is scared to complete.     Please see other TE dated 10/3/24 regarding other patient concerns.    Please review and advise.

## 2024-10-03 NOTE — TELEPHONE ENCOUNTER
Patient calling for refill on benzonatate 200 MG and Steroid Pack due to cough he has spoken to Hemalatha Keyes PA-C about, per pt.     Please see other TE's dated 10/3/24 for other patient concerns.    Please review and advise.

## 2024-10-03 NOTE — TELEPHONE ENCOUNTER
Patient requesting medication refills that he has had in the past for his simple chronic bronchitis. Patient denies any other symptoms.   Please review and advise.     Patient will be in [place] tomorrow and next Tues.       Requested Prescriptions     Pending Prescriptions Disp Refills    benzonatate 200 MG Oral Cap 40 capsule 0     Sig: Take 1 capsule (200 mg total) by mouth 3 (three) times daily as needed for cough.    methylPREDNISolone (MEDROL) 4 MG Oral Tablet Therapy Pack 1 each 0     Sig: As directed.       Last Refill: 3/7/24, 10/4/21    Last OV: 3/7/24    Next OV: 12/10

## 2024-10-07 ENCOUNTER — TELEPHONE (OUTPATIENT)
Dept: FAMILY MEDICINE CLINIC | Facility: CLINIC | Age: 49
End: 2024-10-07

## 2024-10-07 DIAGNOSIS — J41.0 SIMPLE CHRONIC BRONCHITIS (HCC): ICD-10-CM

## 2024-10-07 RX ORDER — BENZONATATE 200 MG/1
200 CAPSULE ORAL 3 TIMES DAILY PRN
Qty: 40 CAPSULE | Refills: 0 | Status: SHIPPED | OUTPATIENT
Start: 2024-10-07

## 2024-10-07 NOTE — TELEPHONE ENCOUNTER
Patient calling states benzonatate 200 MG Oral Cap has not been sent to pharmacy.     Please resend  to , [facility] #[id number] - [place], [place] - [address] AT [facility] [address], [phone], [phone] [[id number]]     Requesting a call back.

## 2024-10-07 NOTE — TELEPHONE ENCOUNTER
Called and notified that script sent. Patient also requesting a different GI doctor closer to home. Advised patient to call insurance to see who is covered by them. Patient voiced understanding and agreeable.

## 2024-10-15 DIAGNOSIS — F41.1 GAD (GENERALIZED ANXIETY DISORDER): ICD-10-CM

## 2024-10-15 RX ORDER — BUSPIRONE HYDROCHLORIDE 10 MG/1
TABLET ORAL
Qty: 60 TABLET | Refills: 1 | Status: SHIPPED | OUTPATIENT
Start: 2024-10-15

## 2024-10-19 ENCOUNTER — HOSPITAL ENCOUNTER (OUTPATIENT)
Dept: CT IMAGING | Age: 49
Discharge: HOME | End: 2024-10-19
Attending: FAMILY MEDICINE
Payer: MEDICAID

## 2024-10-19 ENCOUNTER — HOSPITAL ENCOUNTER (OUTPATIENT)
Dept: GENERAL RADIOLOGY | Age: 49
Discharge: HOME | End: 2024-10-19
Attending: FAMILY MEDICINE
Payer: MEDICAID

## 2024-10-19 ENCOUNTER — LAB ENCOUNTER (OUTPATIENT)
Dept: LAB | Age: 49
End: 2024-10-19
Attending: FAMILY MEDICINE
Payer: MEDICAID

## 2024-10-19 DIAGNOSIS — J41.0 SIMPLE CHRONIC BRONCHITIS (HCC): ICD-10-CM

## 2024-10-19 DIAGNOSIS — Z13.220 ENCOUNTER FOR LIPID SCREENING FOR CARDIOVASCULAR DISEASE: ICD-10-CM

## 2024-10-19 DIAGNOSIS — Z13.6 SCREENING FOR HEART DISEASE: ICD-10-CM

## 2024-10-19 DIAGNOSIS — Z13.6 ENCOUNTER FOR LIPID SCREENING FOR CARDIOVASCULAR DISEASE: ICD-10-CM

## 2024-10-19 DIAGNOSIS — Z13.21 SCREENING FOR ENDOCRINE, NUTRITIONAL, METABOLIC AND IMMUNITY DISORDER: ICD-10-CM

## 2024-10-19 DIAGNOSIS — Z13.228 SCREENING FOR ENDOCRINE, NUTRITIONAL, METABOLIC AND IMMUNITY DISORDER: ICD-10-CM

## 2024-10-19 DIAGNOSIS — E66.3 OVERWEIGHT (BMI 25.0-29.9): ICD-10-CM

## 2024-10-19 DIAGNOSIS — Z13.29 SCREENING FOR ENDOCRINE, NUTRITIONAL, METABOLIC AND IMMUNITY DISORDER: ICD-10-CM

## 2024-10-19 DIAGNOSIS — R63.5 WEIGHT GAIN: ICD-10-CM

## 2024-10-19 DIAGNOSIS — Z13.0 SCREENING FOR ENDOCRINE, NUTRITIONAL, METABOLIC AND IMMUNITY DISORDER: ICD-10-CM

## 2024-10-19 DIAGNOSIS — Z12.5 SCREENING PSA (PROSTATE SPECIFIC ANTIGEN): ICD-10-CM

## 2024-10-19 LAB
ALBUMIN SERPL-MCNC: 4.8 G/DL (ref 3.2–4.8)
ALBUMIN/GLOB SERPL: 1.8 {RATIO} (ref 1–2)
ALP LIVER SERPL-CCNC: 45 U/L
ALT SERPL-CCNC: 55 U/L
ANION GAP SERPL CALC-SCNC: <0 MMOL/L (ref 0–18)
AST SERPL-CCNC: 29 U/L (ref ?–34)
BASOPHILS # BLD AUTO: 0.04 X10(3) UL (ref 0–0.2)
BASOPHILS NFR BLD AUTO: 0.5 %
BILIRUB SERPL-MCNC: 0.6 MG/DL (ref 0.3–1.2)
BUN BLD-MCNC: 17 MG/DL (ref 9–23)
CALCIUM BLD-MCNC: 10.3 MG/DL (ref 8.7–10.4)
CHLORIDE SERPL-SCNC: 109 MMOL/L (ref 98–112)
CHOLEST SERPL-MCNC: 206 MG/DL (ref ?–200)
CO2 SERPL-SCNC: 31 MMOL/L (ref 21–32)
COMPLEXED PSA SERPL-MCNC: 1.95 NG/ML (ref ?–4)
CORTIS SERPL-MCNC: 6.2 UG/DL
CREAT BLD-MCNC: 1.33 MG/DL
EGFRCR SERPLBLD CKD-EPI 2021: 66 ML/MIN/1.73M2 (ref 60–?)
EOSINOPHIL # BLD AUTO: 0.18 X10(3) UL (ref 0–0.7)
EOSINOPHIL NFR BLD AUTO: 2.5 %
ERYTHROCYTE [DISTWIDTH] IN BLOOD BY AUTOMATED COUNT: 12.8 %
FASTING PATIENT LIPID ANSWER: YES
FASTING STATUS PATIENT QL REPORTED: YES
GLOBULIN PLAS-MCNC: 2.6 G/DL (ref 2–3.5)
GLUCOSE BLD-MCNC: 91 MG/DL (ref 70–99)
HCT VFR BLD AUTO: 45.8 %
HDLC SERPL-MCNC: 39 MG/DL (ref 40–59)
HGB BLD-MCNC: 15.7 G/DL
IMM GRANULOCYTES # BLD AUTO: 0.02 X10(3) UL (ref 0–1)
IMM GRANULOCYTES NFR BLD: 0.3 %
LDLC SERPL CALC-MCNC: 143 MG/DL (ref ?–100)
LYMPHOCYTES # BLD AUTO: 2.2 X10(3) UL (ref 1–4)
LYMPHOCYTES NFR BLD AUTO: 30.1 %
MCH RBC QN AUTO: 29.5 PG (ref 26–34)
MCHC RBC AUTO-ENTMCNC: 34.3 G/DL (ref 31–37)
MCV RBC AUTO: 85.9 FL
MONOCYTES # BLD AUTO: 0.67 X10(3) UL (ref 0.1–1)
MONOCYTES NFR BLD AUTO: 9.2 %
NEUTROPHILS # BLD AUTO: 4.21 X10 (3) UL (ref 1.5–7.7)
NEUTROPHILS # BLD AUTO: 4.21 X10(3) UL (ref 1.5–7.7)
NEUTROPHILS NFR BLD AUTO: 57.4 %
NONHDLC SERPL-MCNC: 167 MG/DL (ref ?–130)
OSMOLALITY SERPL CALC.SUM OF ELEC: 287 MOSM/KG (ref 275–295)
PLATELET # BLD AUTO: 260 10(3)UL (ref 150–450)
POTASSIUM SERPL-SCNC: 4.2 MMOL/L (ref 3.5–5.1)
PROT SERPL-MCNC: 7.4 G/DL (ref 5.7–8.2)
RBC # BLD AUTO: 5.33 X10(6)UL
SODIUM SERPL-SCNC: 138 MMOL/L (ref 136–145)
TRIGL SERPL-MCNC: 133 MG/DL (ref 30–149)
TSI SER-ACNC: 1.69 MIU/ML (ref 0.55–4.78)
VLDLC SERPL CALC-MCNC: 25 MG/DL (ref 0–30)
WBC # BLD AUTO: 7.3 X10(3) UL (ref 4–11)

## 2024-10-19 PROCEDURE — 71046 X-RAY EXAM CHEST 2 VIEWS: CPT | Performed by: FAMILY MEDICINE

## 2024-10-19 PROCEDURE — 84443 ASSAY THYROID STIM HORMONE: CPT

## 2024-10-19 PROCEDURE — 80061 LIPID PANEL: CPT

## 2024-10-19 PROCEDURE — 85025 COMPLETE CBC W/AUTO DIFF WBC: CPT

## 2024-10-19 PROCEDURE — 80053 COMPREHEN METABOLIC PANEL: CPT

## 2024-10-19 PROCEDURE — 36415 COLL VENOUS BLD VENIPUNCTURE: CPT

## 2024-10-19 PROCEDURE — 82533 TOTAL CORTISOL: CPT

## 2024-10-20 DIAGNOSIS — R74.01 ELEVATED ALT MEASUREMENT: Primary | ICD-10-CM

## 2024-10-20 DIAGNOSIS — E78.2 MIXED HYPERLIPIDEMIA: ICD-10-CM

## 2024-10-20 DIAGNOSIS — R93.89 ABNORMAL CHEST X-RAY: Primary | ICD-10-CM

## 2024-10-20 NOTE — PROGRESS NOTES
Fatty liver present on over read by radiologist of non cardiac areas of the screening CT scan.    Liver cleansing foods:  Avocados, walnuts, turmeric, garlic, grapefruit, green tea, and green vegetables.    Raw vegetable juice you can  freeze juice immediately after making it and drink it after it thaws, the antioxidants and enzymes will not be damaged.  Vegetables containing phytochemicals - carrots, pumpkins, bell peppers, beets   Vegetables high in sulfur - onions, leeks, red radish, kale, horseradish, garlic, cabbage, brussels sprouts, broccoli, cauliflower   Dark green colored vegetables and grasses such as alfalfa, barley leaf, wheat grass  Herbals:  Organic Turmeric and Milk Thistle Seed  Avoid processed foods:  French fries, chips, pretzels, cookies, cakes, donuts, candies, processed jellies and jams high in sugar, soft drinks, white bread  Preserved meat such as Italian sausage, ham, smoked meats, sausages, frankfurters, corned beef, brown, pizza meats

## 2024-10-20 NOTE — PROGRESS NOTES
A nonspecific abnormal presentation of the pleural space on the left radiologist suggest a chest CT.  Chest CT ordered and will need to be approved by Medicaid replacement.  Scheduling Instructions:  To schedule an appointment for your radiology test please call [facility] at [phone].  Sincerely,   Hemalatha Keyes PA-C

## 2024-10-20 NOTE — PROGRESS NOTES
Normal white and red blood cell counts.  Normal blood sugar testing.  Normal thyroid testing  Normal prostate testing repeat again in 1 year  Cortisol level is normal  Elevated lipid panel cholesterol and LDL Decrease saturated fats and avoid processed foods.   Low HDL exercise can help increase the HDL.  Avoid fatty foods/fried foods.  If eating meat try lean cuts of meat such as chicken and fish (salmon and tuna).  If able, add bibiana seeds, almonds, walnuts, pumpkin seeds, sunflower seeds, beans, fruits, vegetables, avocado steel cut oats, lentils, quinoa, rye, wild rice, whole grain cereals to diet.   Cook with avocado oil, grape seed oil or olive oil. Review the Mediterranean diet on line.    Repeat lipids in 3 months if heart scan shows plaque we will need to discuss lipid management.    Creatinine part of your kidney function is slightly elevated avoid nonsteroidals Aleve and Advil over-the-counter increase water intake 64 ounces per day    Liver function test ALT is slightly elevated which is one of your liver enzymes decreased processed foods, alcohol and acetaminophen.  Liver cleansing foods:  Avocados, walnuts, turmeric, garlic, grapefruit, green tea, and green vegetables.    Raw vegetable juice you can  freeze juice immediately after making it and drink it after it thaws, the antioxidants and enzymes will not be damaged.  Vegetables containing phytochemicals - carrots, pumpkins, bell peppers, beets   Vegetables high in sulfur - onions, leeks, red radish, kale, horseradish, garlic, cabbage, brussels sprouts, broccoli, cauliflower   Dark green colored vegetables and grasses such as alfalfa, barley leaf, wheat grass  Herbals:  Organic Turmeric and Milk Thistle Seed  Avoid processed foods:  French fries, chips, pretzels, cookies, cakes, donuts, candies, processed jellies and jams high in sugar, soft drinks, white bread  Preserved meat such as Italian sausage, ham, smoked meats, sausages, frankfurters, corned  beef, brown, pizza meats    Sincerely,   Hemalatha Keyes PA-C

## 2024-11-05 DIAGNOSIS — J41.0 SIMPLE CHRONIC BRONCHITIS (HCC): ICD-10-CM

## 2024-11-05 RX ORDER — BENZONATATE 200 MG/1
200 CAPSULE ORAL 3 TIMES DAILY PRN
Qty: 60 CAPSULE | Refills: 0 | Status: SHIPPED | OUTPATIENT
Start: 2024-11-05

## 2024-11-05 NOTE — TELEPHONE ENCOUNTER
Catalino Merino requesting medication refill for benzonatate 200 MG Oral Cap .- asking for 60 pills - the only thing that works for him  Mometasone Furoate 100 MCG/ACT Inhalation Aerosol  pharmacy did not get order    LOV: 6/10/2024   Prescribed By:   Pharmacy Location: [facility]    Next Appointment: 11/26/2024 Hemalatha Keyes PA-C      Informed patient to allow up to 24 to 48 business hours before checking with Pharmacy.

## 2024-11-07 ENCOUNTER — TELEPHONE (OUTPATIENT)
Dept: FAMILY MEDICINE CLINIC | Facility: CLINIC | Age: 49
End: 2024-11-07

## 2024-11-07 DIAGNOSIS — R05.3 CHRONIC COUGH: ICD-10-CM

## 2024-11-07 DIAGNOSIS — J41.0 SIMPLE CHRONIC BRONCHITIS (HCC): Primary | ICD-10-CM

## 2024-11-07 RX ORDER — BUDESONIDE 180 UG/1
1 AEROSOL, POWDER RESPIRATORY (INHALATION) 2 TIMES DAILY
Qty: 1 EACH | Refills: 3 | Status: SHIPPED | OUTPATIENT
Start: 2024-11-07

## 2024-11-07 NOTE — TELEPHONE ENCOUNTER
Note from payer: Details of this decision are provided on the physician outcome notice which has been faxed to the number on file.  Payer: [employer] [facility] Case ID: [id number]    [phone]  Electronic appeal: Not supported  View History    Mometasone was denied.  Any further orders?

## 2024-11-11 ENCOUNTER — TELEPHONE (OUTPATIENT)
Dept: FAMILY MEDICINE CLINIC | Facility: CLINIC | Age: 49
End: 2024-11-11

## 2024-11-11 NOTE — TELEPHONE ENCOUNTER
Pulmicort denied by insurance.    Please advise.  Is this something he could use Good Rx for? Looks like $50.19 [facility] or $50.06 [facility] price

## 2024-11-25 ENCOUNTER — TELEPHONE (OUTPATIENT)
Dept: ADMINISTRATIVE | Facility: HOSPITAL | Age: 49
End: 2024-11-25

## 2024-11-25 NOTE — TELEPHONE ENCOUNTER
Good Afternoon, Please be advised that the  CT CHEST LD NO CAD(CPT=71250) has been denied by the Patient Health Plan.  According to Shyla, Dr. Keyes can call and initiate a P2P to be done to see if the Denial can be overturned.  All clinicals has been submitted. Case#[id number] Tel# [phone]      Thanks,  Karen      Denial Reason:    11/22/2024  VIRGILIO ADAMS  [address] [place] [address]  For help to translate or understand this, please call [phone] or TDD/TTY   hearing impaired 711.  Re: Member name: VIRGILIO ADAMS  Member ID No: [id number]  Tracking Number: [id number]  Dear VIRGILIO BULLOCKRYNE:  [employer], offered by [employer] and [facility],   looked at services requested for VIRGILIO ADAMS. The request received on 11/22/2024  for the coverage of the services listed below was Denied.  Procedure Description Units   Requested  Units   Denied  Modifier (if   applicable)  13231 Computed tomography (CT) (a   special kind of picture) of your   chest without contrast (dye)  1 1  Your doctor told us that you have a lung nodule(s). This is a sharply defined, distinct,   round or oval shaped region. An imaging study was asked for. We cannot approve this   request because:  Imaging is only supported if the size of your nodule (growth) warrants it. Your records   do not show the size of your nodule.  Results of prior imaging must show a lung nodule. The notes sent to us do not show   these imaging results.  You must have results of prior chest imaging for comparison. This is done to see if   something that was found on prior imaging is now stable, worse, or resolved

## 2024-11-26 ENCOUNTER — OFFICE VISIT (OUTPATIENT)
Dept: FAMILY MEDICINE CLINIC | Facility: CLINIC | Age: 49
End: 2024-11-26
Payer: MEDICAID

## 2024-11-26 VITALS
TEMPERATURE: 98 F | OXYGEN SATURATION: 96 % | WEIGHT: 224.81 LBS | SYSTOLIC BLOOD PRESSURE: 130 MMHG | DIASTOLIC BLOOD PRESSURE: 74 MMHG | BODY MASS INDEX: 29.16 KG/M2 | HEIGHT: 73.5 IN | RESPIRATION RATE: 16 BRPM

## 2024-11-26 DIAGNOSIS — Z80.0 FH: COLON CANCER: ICD-10-CM

## 2024-11-26 DIAGNOSIS — F41.1 GAD (GENERALIZED ANXIETY DISORDER): ICD-10-CM

## 2024-11-26 DIAGNOSIS — Z80.0 FH: STOMACH CANCER: ICD-10-CM

## 2024-11-26 DIAGNOSIS — R19.8 PEPTIC ULCER SYMPTOMS: ICD-10-CM

## 2024-11-26 DIAGNOSIS — A63.0 GENITAL WARTS: ICD-10-CM

## 2024-11-26 DIAGNOSIS — J41.8 MIXED SIMPLE AND MUCOPURULENT CHRONIC BRONCHITIS (HCC): Primary | ICD-10-CM

## 2024-11-26 DIAGNOSIS — R05.3 CHRONIC COUGH: ICD-10-CM

## 2024-11-26 PROCEDURE — 99215 OFFICE O/P EST HI 40 MIN: CPT | Performed by: FAMILY MEDICINE

## 2024-11-26 RX ORDER — VALACYCLOVIR HYDROCHLORIDE 1 G/1
TABLET, FILM COATED ORAL
Qty: 12 TABLET | Refills: 3 | Status: CANCELLED | OUTPATIENT
Start: 2024-11-26

## 2024-11-26 RX ORDER — BUSPIRONE HYDROCHLORIDE 10 MG/1
TABLET ORAL
Qty: 180 TABLET | Refills: 1 | Status: SHIPPED | OUTPATIENT
Start: 2024-11-26

## 2024-11-26 RX ORDER — ALBUTEROL SULFATE 90 UG/1
2 INHALANT RESPIRATORY (INHALATION) EVERY 4 HOURS PRN
Qty: 1 EACH | Refills: 0 | Status: SHIPPED | OUTPATIENT
Start: 2024-11-26

## 2024-11-26 RX ORDER — BUDESONIDE 180 UG/1
1 AEROSOL, POWDER RESPIRATORY (INHALATION) 2 TIMES DAILY
Qty: 1 EACH | Refills: 3 | Status: CANCELLED | OUTPATIENT
Start: 2024-11-26

## 2024-11-26 RX ORDER — FLUTICASONE PROPIONATE AND SALMETEROL 250; 50 UG/1; UG/1
1 POWDER RESPIRATORY (INHALATION) 2 TIMES DAILY
Qty: 1 EACH | Refills: 3 | Status: SHIPPED | OUTPATIENT
Start: 2024-11-26 | End: 2024-11-26

## 2024-11-26 RX ORDER — FLUTICASONE PROPIONATE AND SALMETEROL 250; 50 UG/1; UG/1
1 POWDER RESPIRATORY (INHALATION) 2 TIMES DAILY
Qty: 1 EACH | Refills: 3 | Status: SHIPPED | OUTPATIENT
Start: 2024-11-26

## 2024-11-26 RX ORDER — IMIQUIMOD 12.5 MG/.25G
1 CREAM TOPICAL
Qty: 24 EACH | Refills: 2 | Status: SHIPPED | OUTPATIENT
Start: 2024-11-27 | End: 2025-02-25

## 2024-11-26 RX ORDER — BENZONATATE 200 MG/1
200 CAPSULE ORAL 3 TIMES DAILY PRN
Qty: 60 CAPSULE | Refills: 0 | Status: SHIPPED | OUTPATIENT
Start: 2024-11-26

## 2024-11-26 RX ORDER — FAMOTIDINE 40 MG/1
40 TABLET, FILM COATED ORAL NIGHTLY PRN
Qty: 30 TABLET | Refills: 0 | Status: CANCELLED | OUTPATIENT
Start: 2024-11-26

## 2024-11-26 RX ORDER — PANTOPRAZOLE SODIUM 40 MG/1
TABLET, DELAYED RELEASE ORAL
Qty: 90 TABLET | Refills: 1 | Status: SHIPPED | OUTPATIENT
Start: 2024-11-26

## 2024-11-27 NOTE — PROGRESS NOTES
Catalino Merino is a 48 year old male.  HPI:     Mixed simple and mucopurulent chronic bronchitis (HCC)  Coughs 3 to 5 months out of the year sometimes just with seasonal changes had 1 episode in October was feeling slightly better but feeling it is occurring again  Cough causes him to feel short of breath at times presently symptoms are minimal  Insurance was not covering steroid he is requesting a steroid to be sent over to help when the symptoms occur  Presently only using Tessalon Perles 2-3 times a day which does work well  Also requesting albuterol inhaler.  Patient was notified that chest CT is not being approved by insurance and he should cancel CT that is schedule reviewed normal results from a heart scan over read that was just done  Did quit smoking    Peptic ulcer symptoms  Needs refill on pantoprazole made appointment GI but was unable to get in until 3/10/2024  Over the summer was using lots of nonsteroidals now uses it rarely but states with his back pain he sometimes needs to use anti-inflammatories  Feels he developed a ulcer from using the nonsteroidals  Is going to see the GI because dad has colon cancer and he wants to get his colonoscopy is very leery of doing an endoscopy even though he needs to with his chronic midepigastric pain  Gets 50 to 60% improvement when he uses pantoprazole is not using Pepcid  Still drinks alcohol intermittently 0 to 10/week history of alcoholism    MANDY (generalized anxiety disorder)  Has multiple life stressors is very anxious feels the BuSpar 10 mg 1/2-1 twice a day has been working well requests refill.      Genital warts  Requests medication for genital warts defers any kind of genital exam today states that they look like skin tags but slightly more rough    FH: colon cancer Father  FH: stomach cancer Brother  Is interested in genetic counseling  Current Outpatient Medications   Medication Sig Dispense Refill    famotidine 40 MG Oral Tab       benzonatate 200 MG  Oral Cap Take 1 capsule (200 mg total) by mouth 3 (three) times daily as needed for cough. 60 capsule 0    pantoprazole 40 MG Oral Tab EC Take one tablet (40 mg total) by mouth once daily, 30 minutes prior to breakfast. 90 tablet 1    albuterol 108 (90 Base) MCG/ACT Inhalation Aero Soln Inhale 2 puffs into the lungs every 4 (four) hours as needed for Wheezing or Shortness of Breath (or cough). 1 each 0    busPIRone 10 MG Oral Tab TAKE 1/2 TO 1 TABLET BY MOUTH TWICE DAILY FOR ANXIETY 180 tablet 1    [START ON 2024] Imiquimod 5 % External Cream Apply 1 Application topically 3 (three) times a week. 24 each 2    fluticasone-salmeterol (WIXELA INHUB) 250-50 MCG/ACT Inhalation Aerosol Powder, Breath Activated Inhale 1 puff into the lungs 2 (two) times daily. Rinse mouth after use 1 each 3    valACYclovir (VALTREX) 1 G Oral Tab 2 grams twice day for 1 day 12 tablet 3      Past Medical History:    Lumbar disc disease with radiculopathy      Social History:  Social History     Socioeconomic History    Marital status:    Tobacco Use    Smoking status: Former     Current packs/day: 0.00     Average packs/day: 0.3 packs/day for 10.0 years (2.5 ttl pk-yrs)     Types: Cigarettes     Start date: 10/1/2008     Quit date: 10/1/2018     Years since quittin.1    Smokeless tobacco: Never   Vaping Use    Vaping status: Never Used   Substance and Sexual Activity    Alcohol use: Yes     Alcohol/week: 2.0 - 5.0 standard drinks of alcohol     Types: 2 - 5 Standard drinks or equivalent per week    Drug use: No   Other Topics Concern     Service No    Blood Transfusions No    Caffeine Concern Yes     Comment: 3 coffees or teas weekly    Occupational Exposure No    Hobby Hazards No    Sleep Concern Yes    Stress Concern Yes    Weight Concern Yes    Special Diet No    Back Care No    Exercise No    Bike Helmet No    Seat Belt Yes    Self-Exams No     Social Drivers of Health      Received from [facility]  [place], [facility]    Housing Stability        REVIEW OF SYSTEMS:   GENERAL HEALTH: feels well otherwise  SKIN: denies any unusual skin lesions or rashes  RESPIRATORY: denies shortness of breath with exertion  CARDIOVASCULAR: denies chest pain on exertion  GI: denies abdominal pain and denies heartburn  NEURO: denies headaches  Musculoskeletal: No motor deficits  Psych anxious very talkative see above  EXAM:   /74 (BP Location: Left arm, Patient Position: Sitting, Cuff Size: adult)   Temp 97.9 °F (36.6 °C) (Temporal)   Resp 16   Ht 6' 1.5\" (1.867 m)   Wt 224 lb 12.8 oz (102 kg)   SpO2 96%   BMI 29.26 kg/m²   GENERAL: well developed, well nourished,in no apparent distress  SKIN: no rashes,no suspicious lesions  EYES: PERRLA, EOM intact, sclera clear without injection  NECK: supple,no adenopathy, thyroid normal to palpation  LUNGS: clear to auscultation no rales, rhonchi or wheezes  CARDIO: RRR without murmur  GI: Normal bowel sounds ×4 quadrant, no hepatosplenomegaly or masses, and no tenderness   EXTREMITIES: no cyanosis, clubbing or edema  Musculoskeletal: No gross deficit  Neurological: nerves II through XII grossly intact no sensorimotor deficit  Psychological: Mood and affect are normal.  Good communication skills.  ASSESSMENT AND PLAN:     Encounter Diagnoses   Name Primary?    Mixed simple and mucopurulent chronic bronchitis (HCC) Yes    Chronic cough     Peptic ulcer symptoms     MANDY (generalized anxiety disorder)     Genital warts     FH: colon cancer     FH: stomach cancer        No orders of the defined types were placed in this encounter.      Meds & Refills for this Visit:  Requested Prescriptions     Signed Prescriptions Disp Refills    benzonatate 200 MG Oral Cap 60 capsule 0     Sig: Take 1 capsule (200 mg total) by mouth 3 (three) times daily as needed for cough.    pantoprazole 40 MG Oral Tab EC 90 tablet 1     Sig: Take one tablet (40 mg total) by mouth once daily,  30 minutes prior to breakfast.    albuterol 108 (90 Base) MCG/ACT Inhalation Aero Soln 1 each 0     Sig: Inhale 2 puffs into the lungs every 4 (four) hours as needed for Wheezing or Shortness of Breath (or cough).    busPIRone 10 MG Oral Tab 180 tablet 1     Sig: TAKE 1/2 TO 1 TABLET BY MOUTH TWICE DAILY FOR ANXIETY    Imiquimod 5 % External Cream 24 each 2     Sig: Apply 1 Application topically 3 (three) times a week.    fluticasone-salmeterol (WIXELA INHUB) 250-50 MCG/ACT Inhalation Aerosol Powder, Breath Activated 1 each 3     Sig: Inhale 1 puff into the lungs 2 (two) times daily. Rinse mouth after use       Imaging & Consults:  OP REFERRAL TO [facility] GENETIC COUNSELING  1. Mixed simple and mucopurulent chronic bronchitis (HCC)  Reviewed medication benefits and side effects.   Continue with benzoate as needed   When cough starts start Wixela twice a day rinse mouth after use  - benzonatate 200 MG Oral Cap; Take 1 capsule (200 mg total) by mouth 3 (three) times daily as needed for cough.  Dispense: 60 capsule; Refill: 0  - albuterol 108 (90 Base) MCG/ACT Inhalation Aero Soln; Inhale 2 puffs into the lungs every 4 (four) hours as needed for Wheezing or Shortness of Breath (or cough).  Dispense: 1 each; Refill: 0  - fluticasone-salmeterol (WIXELA INHUB) 250-50 MCG/ACT Inhalation Aerosol Powder, Breath Activated; Inhale 1 puff into the lungs 2 (two) times daily. Rinse mouth after use  Dispense: 1 each; Refill: 3    2. Chronic cough  As above  - fluticasone-salmeterol (WIXELA INHUB) 250-50 MCG/ACT Inhalation Aerosol Powder, Breath Activated; Inhale 1 puff into the lungs 2 (two) times daily. Rinse mouth after use  Dispense: 1 each; Refill: 3    3. Peptic ulcer symptoms  GERD prevention reviewed avoid caffeine, alcohol, and nonstnoeroidals.  Eat small frequent meals and avoid eating 3-4 hours before bedtime, try to raise the head of bed.  Discussed importance of getting endoscopy has appointment with GI  3/2024  - pantoprazole 40 MG Oral Tab EC; Take one tablet (40 mg total) by mouth once daily, 30 minutes prior to breakfast.  Dispense: 90 tablet; Refill: 1    4. MANDY (generalized anxiety disorder)  Reviewed medication benefits and side effects.   Continue with avoiding alcohol  - busPIRone 10 MG Oral Tab; TAKE 1/2 TO 1 TABLET BY MOUTH TWICE DAILY FOR ANXIETY  Dispense: 180 tablet; Refill: 1    5. Genital warts  Conservative use of cream on affected area   - Imiquimod 5 % External Cream; Apply 1 Application topically 3 (three) times a week.  Dispense: 24 each; Refill: 2    6. FH: colon cancer  Colonoscopy as planned has appointment GI 3/2025  - Genetic Counseling [facility] Location    7. FH: stomach cancer  Brother with stomach cancer and dad with colon cancer  - Genetic Counseling [facility] Location  Time spent was 45 minutes more than 50% was spent on counseling regarding medical conditions and treatment.  Rest of time was spent reviewing chart, reviewing blood work and radiology tests.     Provider patient relationship has had a longitudinal long term relationship to address and treat complex conditions.    The patient indicates understanding of these issues and agrees to the plan.  The patient is asked to return in 3 months as needed.

## 2024-12-03 ENCOUNTER — TELEPHONE (OUTPATIENT)
Dept: FAMILY MEDICINE CLINIC | Facility: CLINIC | Age: 49
End: 2024-12-03

## 2024-12-03 NOTE — TELEPHONE ENCOUNTER
Prior Authorization along with OV notes faxed to [employer] at [phone] for fluticasone-salmeterol [phone]

## 2025-01-06 DIAGNOSIS — J41.8 MIXED SIMPLE AND MUCOPURULENT CHRONIC BRONCHITIS (HCC): ICD-10-CM

## 2025-01-06 RX ORDER — BENZONATATE 200 MG/1
200 CAPSULE ORAL 3 TIMES DAILY PRN
Qty: 60 CAPSULE | Refills: 0 | OUTPATIENT
Start: 2025-01-06

## 2025-01-11 DIAGNOSIS — J41.8 MIXED SIMPLE AND MUCOPURULENT CHRONIC BRONCHITIS (HCC): ICD-10-CM

## 2025-01-13 RX ORDER — FAMOTIDINE 40 MG/1
40 TABLET, FILM COATED ORAL 2 TIMES DAILY PRN
Qty: 90 TABLET | Refills: 1 | Status: SHIPPED | OUTPATIENT
Start: 2025-01-13

## 2025-01-13 RX ORDER — ALBUTEROL SULFATE 90 UG/1
2 INHALANT RESPIRATORY (INHALATION) EVERY 4 HOURS PRN
Qty: 1 EACH | Refills: 0 | Status: SHIPPED | OUTPATIENT
Start: 2025-01-13

## 2025-01-13 RX ORDER — BENZONATATE 200 MG/1
200 CAPSULE ORAL 3 TIMES DAILY PRN
Qty: 60 CAPSULE | Refills: 0 | Status: SHIPPED | OUTPATIENT
Start: 2025-01-13

## 2025-01-13 NOTE — TELEPHONE ENCOUNTER
Requested Prescriptions     Pending Prescriptions Disp Refills    famotidine 40 MG Oral Tab  0       Last Refill: 7/10/24    Last OV: 11/26    Please review and advise

## 2025-01-13 NOTE — TELEPHONE ENCOUNTER
Requested Prescriptions     Pending Prescriptions Disp Refills    benzonatate 200 MG Oral Cap 60 capsule 0     Sig: Take 1 capsule (200 mg total) by mouth 3 (three) times daily as needed for cough.    albuterol 108 (90 Base) MCG/ACT Inhalation Aero Soln 1 each 0     Sig: Inhale 2 puffs into the lungs every 4 (four) hours as needed for Wheezing or Shortness of Breath (or cough).       Last Refill: 11/26/24    Last OV: 11/26/24

## 2025-01-29 NOTE — TELEPHONE ENCOUNTER
Attempted to call patient to discuss symptoms. No VM. Will try again. Attempted to contact to r/s patient's annual appointment for 1/31. Unable to reach patient directly. LVM    REASON FOR APPOINTMENT CHANGE:   MD overbooked    PLAN OF CARE (if necessary):    Please reschedule patient with [facility] or next available

## 2025-01-30 DIAGNOSIS — J41.8 MIXED SIMPLE AND MUCOPURULENT CHRONIC BRONCHITIS (HCC): ICD-10-CM

## 2025-01-30 RX ORDER — BENZONATATE 200 MG/1
200 CAPSULE ORAL 3 TIMES DAILY PRN
Qty: 60 CAPSULE | Refills: 0 | OUTPATIENT
Start: 2025-01-30

## 2025-02-11 ENCOUNTER — OFFICE VISIT (OUTPATIENT)
Dept: GASTROENTEROLOGY | Facility: CLINIC | Age: 50
End: 2025-02-11

## 2025-02-11 ENCOUNTER — TELEPHONE (OUTPATIENT)
Dept: GASTROENTEROLOGY | Facility: CLINIC | Age: 50
End: 2025-02-11

## 2025-02-11 VITALS
SYSTOLIC BLOOD PRESSURE: 128 MMHG | BODY MASS INDEX: 30.35 KG/M2 | HEIGHT: 73.5 IN | HEART RATE: 65 BPM | DIASTOLIC BLOOD PRESSURE: 83 MMHG | WEIGHT: 234 LBS

## 2025-02-11 DIAGNOSIS — R10.10 UPPER ABDOMINAL PAIN: ICD-10-CM

## 2025-02-11 DIAGNOSIS — K59.01 SLOW TRANSIT CONSTIPATION: ICD-10-CM

## 2025-02-11 DIAGNOSIS — Z12.11 COLON CANCER SCREENING: Primary | ICD-10-CM

## 2025-02-11 DIAGNOSIS — K76.0 FATTY LIVER DISEASE, NONALCOHOLIC: ICD-10-CM

## 2025-02-11 DIAGNOSIS — K21.9 GASTROESOPHAGEAL REFLUX DISEASE, UNSPECIFIED WHETHER ESOPHAGITIS PRESENT: Primary | ICD-10-CM

## 2025-02-11 DIAGNOSIS — K92.1 BLOOD IN STOOL: ICD-10-CM

## 2025-02-11 DIAGNOSIS — R74.8 ABNORMAL TRANSAMINASES: ICD-10-CM

## 2025-02-11 DIAGNOSIS — R10.13 DYSPEPSIA: ICD-10-CM

## 2025-02-11 DIAGNOSIS — R14.0 ABDOMINAL BLOATING: ICD-10-CM

## 2025-02-11 PROCEDURE — 99204 OFFICE O/P NEW MOD 45 MIN: CPT | Performed by: INTERNAL MEDICINE

## 2025-02-11 RX ORDER — POLYETHYLENE GLYCOL 3350, SODIUM SULFATE ANHYDROUS, SODIUM BICARBONATE, SODIUM CHLORIDE, POTASSIUM CHLORIDE 236; 22.74; 6.74; 5.86; 2.97 G/4L; G/4L; G/4L; G/4L; G/4L
4 POWDER, FOR SOLUTION ORAL ONCE
Qty: 1 EACH | Refills: 0 | Status: SHIPPED | OUTPATIENT
Start: 2025-02-11 | End: 2025-02-11

## 2025-02-11 NOTE — PATIENT INSTRUCTIONS
1. Daily laxative regimen to make you more regular. Goal is 1-2 easy bowel movements per day.  Need to take every day or several times per week.  Options:    A.  Stool softeners - Colace/docusate - 2-6 pills per day (all at once or 1-4 twice per day)  - Bran cereal (including Raisin Bran) - really works, try with almond milk    B.  Daily dose of fiber powder - helps some people and makes some people worse (diarrhea or bloating).  Only the POWDERS work, not capsules or gummies.  -  Metamucil 1-2 heaping tablespoons in water or juice once or twice daily , also comes as pills which aren't as effective     -  Best: Konsyl fiber powder (unflavored - no sugar or flavor)(white and red plastic Ziploc bag/package) -- usually not on pharmacy shelves, often need to order for shipment or in store pickup at [facility], [facility] or [facility], or of course [facility]3] - 1 heaping tablespoon in water or juice once or twice per day; or    -  Benefiber (same) (synthetic; less bloating)      C. less bloating: \"Miralax\"  (white and purple bottle) 1/2 to 1 capful of powder in a glass of water daily (or at least 3x per week);  May lose effect in 1-2 years  OR \"Milk of Magnesia\" 1-2 TBSP once or twice daily, sometimes as back-up    D. Stronger natural remedies -   Aloe Vera liquid/syrup or capsules - health food [employer] and [employer]/many pharmacies;  Papaya or Prunes/prune juice does really work; Dragon Fruit may work  \"Calm\" magnesium product: Gentle laxative and apparently magnesium helps with sleep    GI schedulers -    Please schedule colonoscopy exam at [facility]""[facility] ([facility])    BMI Readings from Last 1 Encounters:   02/11/25 30.45 kg/m²     MAC anesthesia     BP Readings from Last 5 Encounters:   02/11/25 128/83   11/26/24 130/74   03/07/24 122/84   06/14/23 128/64   07/19/21 108/78       Suprep small volume bowel prep if covered by insurance, otherwise   Golytely (PEG) 4L bowel prep  Rx sent in to [facility] [place]      DX =  Screening colonoscopy examination    Medication instructions:    None
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
## 2025-02-11 NOTE — TELEPHONE ENCOUNTER
Scheduled for:  Colonoscopy 24689  Provider Name:  Dr. Toledo  Date:  2/17/2025  Location:[facility]  Sedation:  MAC  Time: 3:00 pm (pt is aware that ENDO will call the day before to confirm arrival time)    Prep:  Trilyte  Meds/Allergies Reconciled?:  Physician Reviewed   Diagnosis with codes:    Colon Screening Z12.11  Was patient informed to call insurance with codes (Y/N):  Yes  Referral sent?:  Referral was sent at the time of electronic surgical scheduling.  [facility] or [facility] notified?:  I sent an electronic request to Endo Scheduling and received a confirmation today.  Medication Orders:  Pt is aware to NOT take iron pills, herbal meds and diet supplements for 7 days before exam. Also to NOT take any form of alcohol, recreational drugs and any forms of ED meds 24 hours before exam.   Misc Orders:       Further instructions given by staff:  I provide prep instructions to patient at the time of the appointment and reviewed date, time and location, he verbalized that he understood and is aware to call if he has any questions.

## 2025-02-17 ENCOUNTER — HOSPITAL ENCOUNTER (OUTPATIENT)
Facility: HOSPITAL | Age: 50
Setting detail: HOSPITAL OUTPATIENT SURGERY
Discharge: HOME | End: 2025-02-17
Attending: INTERNAL MEDICINE | Admitting: INTERNAL MEDICINE
Payer: MEDICAID

## 2025-02-17 ENCOUNTER — ANESTHESIA (OUTPATIENT)
Dept: ENDOSCOPY | Facility: HOSPITAL | Age: 50
End: 2025-02-17
Payer: MEDICAID

## 2025-02-17 ENCOUNTER — ANESTHESIA EVENT (OUTPATIENT)
Dept: ENDOSCOPY | Facility: HOSPITAL | Age: 50
End: 2025-02-17
Payer: MEDICAID

## 2025-02-17 VITALS
SYSTOLIC BLOOD PRESSURE: 136 MMHG | RESPIRATION RATE: 24 BRPM | HEART RATE: 78 BPM | OXYGEN SATURATION: 98 % | WEIGHT: 230 LBS | BODY MASS INDEX: 29.52 KG/M2 | HEIGHT: 74 IN | DIASTOLIC BLOOD PRESSURE: 91 MMHG

## 2025-02-17 DIAGNOSIS — Z12.11 COLON CANCER SCREENING: ICD-10-CM

## 2025-02-17 PROBLEM — K64.8 INTERNAL HEMORRHOIDS: Status: ACTIVE | Noted: 2025-02-17

## 2025-02-17 PROCEDURE — 0DBP8ZX EXCISION OF RECTUM, VIA NATURAL OR ARTIFICIAL OPENING ENDOSCOPIC, DIAGNOSTIC: ICD-10-PCS | Performed by: INTERNAL MEDICINE

## 2025-02-17 PROCEDURE — 45385 COLONOSCOPY W/LESION REMOVAL: CPT | Performed by: INTERNAL MEDICINE

## 2025-02-17 RX ORDER — LIDOCAINE HYDROCHLORIDE 10 MG/ML
INJECTION, SOLUTION EPIDURAL; INFILTRATION; INTRACAUDAL; PERINEURAL AS NEEDED
Status: DISCONTINUED | OUTPATIENT
Start: 2025-02-17 | End: 2025-02-17 | Stop reason: SURG

## 2025-02-17 RX ORDER — SODIUM CHLORIDE, SODIUM LACTATE, POTASSIUM CHLORIDE, CALCIUM CHLORIDE 600; 310; 30; 20 MG/100ML; MG/100ML; MG/100ML; MG/100ML
INJECTION, SOLUTION INTRAVENOUS CONTINUOUS
Status: DISCONTINUED | OUTPATIENT
Start: 2025-02-17 | End: 2025-02-17

## 2025-02-17 RX ORDER — NALOXONE HYDROCHLORIDE 0.4 MG/ML
0.08 INJECTION, SOLUTION INTRAMUSCULAR; INTRAVENOUS; SUBCUTANEOUS ONCE AS NEEDED
Status: DISCONTINUED | OUTPATIENT
Start: 2025-02-17 | End: 2025-02-17

## 2025-02-17 RX ADMIN — SODIUM CHLORIDE, SODIUM LACTATE, POTASSIUM CHLORIDE, CALCIUM CHLORIDE: 600; 310; 30; 20 INJECTION, SOLUTION INTRAVENOUS at 14:14:00

## 2025-02-17 RX ADMIN — LIDOCAINE HYDROCHLORIDE 50 MG: 10 INJECTION, SOLUTION EPIDURAL; INFILTRATION; INTRACAUDAL; PERINEURAL at 14:15:00

## 2025-02-17 RX ADMIN — SODIUM CHLORIDE, SODIUM LACTATE, POTASSIUM CHLORIDE, CALCIUM CHLORIDE: 600; 310; 30; 20 INJECTION, SOLUTION INTRAVENOUS at 14:49:00

## 2025-02-17 NOTE — OPERATIVE REPORT
[facility]    COLONOSCOPY PROCEDURE REPORT     DATE OF PROCEDURE:  2/17/2025     PCP: No primary care provider on file.     PREOPERATIVE DIAGNOSIS: Screening colonoscopy examination     POSTOPERATIVE DIAGNOSIS:  See impression.     SURGEON:  Delta Toledo M.D.     SEDATION:    MAC anesthesia provided by the Anesthesia Service.  MAC anesthesia requested due to age 49 and eversion of the exam; anticipated intolerance of colonoscopy examination under safe doses conscious sedation medications       COLONOSCOPY PROCEDURE:   After the nature and risks of colonoscopy examination under MAC anesthesia were discussed with the patient and all questions answered, informed consent was obtained.  The patient was sedated as above.      Digital rectal exam was performed which showed no masses.  The Olympus pediatric video colonoscope was placed in the patient's rectum and advanced under direct visualization through the entire length of the colon up to the cecum and terminal ileum.  Retroflex exam performed up the ascending colon to the hepatic flexure.  The cecum was confirmed by landmarks including appendiceal orifice, cecal trifold, ileocecal valve.  Retroflexion was performed in the rectum.    The quality of the prep was fair; a thick coating of gelatinous chyme throughout the entire colon was gradually washed and suctioned out with some resulting peristalsis which limited our exam today.    Estimated blood loss: none/insignificant       COLONOSCOPY FINDINGS:    2 smooth sessile 4-5 mm colon polyps possibly hyperplastic removed from the proximal rectal vault by cold snare polypectomy technique, suctioned out.  Small internal hemorrhoids.  Small external hemorrhoids or skin tag but no neoplasm external anal canal and perineum.      RECOMMENDATIONS:  High fiber diet.  Follow-up above colon polyp pathology results.  Repeat colonoscopy examination in 5 years due to above, family history of rectal cancer in a  parent.  No aspirin or NSAID medications for next 5 days to prevent bleeding

## 2025-02-17 NOTE — ANESTHESIA POSTPROCEDURE EVALUATION
Patient: Catalino Merino    Procedure Summary       Date: 02/17/25 Room / Location: [facility] ENDOSCOPY 01 / [facility] ENDOSCOPY    Anesthesia Start:  Anesthesia Stop:     Procedure: COLONOSCOPY Diagnosis:       Colon cancer screening      (Colon cancer screening)    Surgeons: Delta Toledo MD Anesthesiologist:     Anesthesia Type: Not recorded ASA Status: Not recorded            Anesthesia Type: No value filed.    Vitals Value Taken Time   BP 98/65 02/17/25 1344   Temp 98 °F (36.7 °C) 02/17/25 1344   Pulse 16 02/17/25 1344   Resp 16 02/17/25 1344   SpO2 96 % 02/17/25 1344       [facility] AN Post Evaluation:   Patient Evaluated in PACU  Patient Participation: complete - patient participated  Level of Consciousness: sleepy but conscious  Pain Score: 0  Pain Management: adequate  Airway Patency:patent  Dental exam unchanged from preop  Yes    Cardiovascular Status: stable and acceptable  Respiratory Status: acceptable and room air  Postoperative Hydration acceptable      CIERA LOVE CRNA  2/17/2025 1:45 PM

## 2025-02-17 NOTE — DISCHARGE INSTRUCTIONS
.  .  .  Notes from Dr Toledo:  2 small benign colon polyps were found and removed today - to be sent to the lab to be examined - a letter will be sent with results.  You may see small quantities of blood with your next 1-2 bowel movements today.    If you check your results on [employer], the \"pathology\" report on the colon polyp(s) should be released within the next 24-48 hours.  In general, a \"hyperplastic\" colon polyp is completely benign, vs an \"adenoma\" or \"sessile serrated adenoma\" which is considered a benign but precancerous colon polyp.  That will be explained in a [employer] message from me as well.  No aspirin, Excedrin, Advil/Motrin/ibuprofen, Aleve/naproxen for next 5 days (to prevent bleeding.)  Internal hemorrhoids - very common  As small thing you feel anal area is either an external hemorrhoid or a small skin tag, but nothing large enough to worry about.  If you are raw and irritated back there after all of that diarrhea and wiping, three good options to soothe and heal the irritation include Aquaphor ointment, \"Desitin\" or \"A & D\" diaper ointment, or good old-fashioned Vaseline.  All of these soothe and create a protective barrier so that your skin can heal.  I recommend repeat colonoscopy exam in 5 years mainly to follow-up on your father's history.  .  .  .     Home Care Instructions for Colonoscopy with Sedation    Diet:  - Resume your regular diet as tolerated unless otherwise instructed.  - Start with light meals to minimize bloating.  - Do not drink alcohol today.    Medication:  - If you have questions about resuming your normal medications, please contact your Primary Care Physician.    Activities:  - Take it easy today. Do not return to work today.  - Do not drive today.  - Do not operate any machinery today (including kitchen equipment).    Colonoscopy:  - You may notice some rectal \"spotting\" (a little blood on the toilet tissue) for a day or two after the exam. This is normal.  - If you  experience any rectal bleeding (not spotting), persistent tenderness or sharp severe abdominal pains, oral temperature over 100 degrees Fahrenheit, light-headedness or dizziness, or any other problems, contact your doctor.      **If unable to reach your doctor, please go to the [facility] Emergency Room**    - Your referring physician will receive a full report of your examination.  - If you do not hear from your doctor's office within two weeks of your biopsy, please call them for your results.    You may be able to see your laboratory results in [employer] between 4 and 7 business days.  In some cases, your physician may not have viewed the results before they are released to [employer].  If you have questions regarding your results contact the physician who ordered the test/exam by phone or via [employer] by choosing \"Ask a Medical Question.\"

## 2025-02-17 NOTE — ANESTHESIA POSTPROCEDURE EVALUATION
Patient: Catalino Merino    Procedure Summary       Date: 02/17/25 Room / Location: [facility] ENDOSCOPY 01 / [facility] ENDOSCOPY    Anesthesia Start: 1413 Anesthesia Stop: 1503    Procedure: COLONOSCOPY Diagnosis:       Colon cancer screening      (Colon polyps, hemorrhoids)    Surgeons: Delta Toledo MD Anesthesiologist: Magaly Nixon MD    Anesthesia Type: MAC ASA Status: 2            Anesthesia Type: MAC    Vitals Value Taken Time   /87 02/17/25 1505   Temp 98.6 02/17/25 1508   Pulse 65 02/17/25 1507   Resp 11 02/17/25 1507   SpO2 100 % 02/17/25 1507   Vitals shown include unfiled device data.    [facility] AN Post Evaluation:   Patient Evaluated in PACU  Patient Participation: complete - patient participated  Level of Consciousness: awake  Pain Management: adequate  Airway Patency:patent  Yes    Nausea/Vomiting: none  Cardiovascular Status: hemodynamically stable  Respiratory Status: acceptable  Postoperative Hydration stable      Magaly Nixon MD  2/17/2025 3:08 PM

## 2025-02-17 NOTE — H&P
History & Physical Examination    Patient Name: Catalino Merino  MRN: [id number]  CSN: [id number]  YOB: 1975    Diagnosis: 49-year-old gentleman with family history of advanced, metastatic rectal cancer in his father who presents for screening colonoscopy examination    PRESENT ILLNESS: Screening colonoscopy examination      BMI Readings from Last 1 Encounters:   25 29.53 kg/m²         Prescriptions Prior to Admission[1]  Current Facility-Administered Medications   Medication Dose Route Frequency    lactated ringers infusion   Intravenous Continuous       Allergies: Allergies[2]    Past Medical History:    Anxiety state    Disorder of liver    fatty liver    Lumbar disc disease with radiculopathy     Past Surgical History:   Procedure Laterality Date    Appendectomy   or     [facility]     Family History   Problem Relation Age of Onset    Ear Problems Father     No Known Problems Son     No Known Problems Son     Ear Problems Paternal Uncle      Social History     Tobacco Use    Smoking status: Former     Current packs/day: 0.00     Average packs/day: 0.3 packs/day for 10.0 years (2.5 ttl pk-yrs)     Types: Cigarettes     Start date: 10/1/2008     Quit date: 10/1/2018     Years since quittin.3    Smokeless tobacco: Never   Substance Use Topics    Alcohol use: Yes     Alcohol/week: 2.0 - 5.0 standard drinks of alcohol     Types: 2 - 5 Standard drinks or equivalent per week       SYSTEM Check if Review is Normal Check if Physical Exam is Normal If not normal, please explain:   HEENT [ ] [X ]    NECK & BACK [ ] [ ]    HEART [ X ] [ X ]    LUNGS [ X ] [ X ]    ABDOMEN [ X ] [ X ]    UROGENITAL [ ] [ ]    EXTREMITIES [ ] [ ]    OTHER        See Anesthesia documentation    [ x ] I have discussed the risks and benefits and alternatives with the patient/family.  They understand and agree to proceed with plan of care.  [ x ] I have reviewed the History and Physical done within the last 30  days.  Any changes noted above.    Delta Toledo MD  2025  1:50 PM             [1]   Medications Prior to Admission   Medication Sig Dispense Refill Last Dose/Taking    benzonatate 200 MG Oral Cap Take 1 capsule (200 mg total) by mouth 3 (three) times daily as needed for cough. 60 capsule 0 2025 Morning    albuterol 108 (90 Base) MCG/ACT Inhalation Aero Soln Inhale 2 puffs into the lungs every 4 (four) hours as needed for Wheezing or Shortness of Breath (or cough). 1 each 0 Taking As Needed    famotidine 40 MG Oral Tab Take 1 tablet (40 mg total) by mouth 2 (two) times daily as needed for Heartburn. 90 tablet 1 2025    pantoprazole 40 MG Oral Tab EC Take one tablet (40 mg total) by mouth once daily, 30 minutes prior to breakfast. 90 tablet 1 2025    busPIRone 10 MG Oral Tab TAKE 1/2 TO 1 TABLET BY MOUTH TWICE DAILY FOR ANXIETY 180 tablet 1 2025    fluticasone-salmeterol (WIXELA INHUB) 250-50 MCG/ACT Inhalation Aerosol Powder, Breath Activated Inhale 1 puff into the lungs 2 (two) times daily. Rinse mouth after use 1 each 3 Taking    valACYclovir (VALTREX) 1 G Oral Tab 2 grams twice day for 1 day 12 tablet 3 Taking    [] PEG 3350-KCl-NaBcb-NaCl-NaSulf (PEG-3350/ELECTROLYTES) 236 g Oral Recon Soln Take 4 L by mouth one time for 1 dose. 1 each 0     Imiquimod 5 % External Cream Apply 1 Application topically 3 (three) times a week. 24 each 2    [2] No Known Allergies

## 2025-02-17 NOTE — ANESTHESIA PREPROCEDURE EVALUATION
Anesthesia PreOp Note    HPI:     Catalino Merino is a 49 year old male who presents for preoperative consultation requested by: Delta Toledo MD    Date of Surgery: 2/17/2025    Procedure(s):  COLONOSCOPY  Indication: Colon cancer screening    Relevant Problems   No relevant active problems       NPO:  Last Liquid Consumption Date: 02/17/25  Last Liquid Consumption Time: 0730  Last Solid Consumption Date: 02/16/25  Last Solid Consumption Time: 0930  Last Liquid Consumption Date: 02/17/25          History Review:  Patient Active Problem List    Diagnosis Date Noted    Mixed simple and mucopurulent chronic bronchitis (HCC) 11/26/2024    Chronic cough 11/26/2024    Genital warts 11/26/2024    MANDY (generalized anxiety disorder) 03/08/2024    Attention deficit 03/08/2024    Peptic ulcer symptoms 03/08/2024    Simple chronic bronchitis (HCC) 03/08/2024    FH: colon cancer 03/08/2024    Overweight (BMI 25.0-29.9) 03/08/2024    FH: colon polyps 06/15/2023    Dysfunction of both eustachian tubes 02/11/2021    Benign paroxysmal positional vertigo due to bilateral vestibular disorder 02/11/2021    Gastroesophageal reflux disease without esophagitis 02/11/2021    Slow transit constipation 02/11/2021    Recurrent cold sores 05/04/2016    Encounter for smoking cessation counseling 05/04/2016    ED (erectile dysfunction) of non-organic origin 04/25/2016       Past Medical History:    Anxiety state    Disorder of liver    fatty liver    Lumbar disc disease with radiculopathy       Past Surgical History:   Procedure Laterality Date    Appendectomy  1998 or 1999    [facility]       Prescriptions Prior to Admission[1]  Current Medications and Prescriptions Ordered in Epic[2]    Allergies[3]    Family History   Problem Relation Age of Onset    Ear Problems Father     No Known Problems Son     No Known Problems Son     Ear Problems Paternal Uncle      Social History     Socioeconomic History    Marital status:     Tobacco Use    Smoking status: Former     Current packs/day: 0.00     Average packs/day: 0.3 packs/day for 10.0 years (2.5 ttl pk-yrs)     Types: Cigarettes     Start date: 10/1/2008     Quit date: 10/1/2018     Years since quittin.3    Smokeless tobacco: Never   Vaping Use    Vaping status: Never Used   Substance and Sexual Activity    Alcohol use: Yes     Alcohol/week: 2.0 - 5.0 standard drinks of alcohol     Types: 2 - 5 Standard drinks or equivalent per week    Drug use: No   Other Topics Concern     Service No    Blood Transfusions No    Caffeine Concern Yes     Comment: 3 coffees or teas weekly    Occupational Exposure No    Hobby Hazards No    Sleep Concern Yes    Stress Concern Yes    Weight Concern Yes    Special Diet No    Back Care No    Exercise No    Bike Helmet No    Seat Belt Yes    Self-Exams No       Available pre-op labs reviewed.             Vital Signs:  Body mass index is 29.53 kg/m².   height is 1.88 m (6' 2\") and weight is 104.3 kg (230 lb). His temperature is 98 °F (36.7 °C). His blood pressure is 98/65 and his pulse is 16 (abnormal). His respiration is 16 and oxygen saturation is 96%.   Vitals:    25 0936 25 1256 25 1306 25 1344   BP:   140/88 98/65   Pulse:   77 (!) 16   Resp:   16 16   Temp:    98 °F (36.7 °C)   SpO2:   97% 96%   Weight: 106.1 kg (234 lb) 104.3 kg (230 lb)     Height: 1.867 m (6' 1.5\") 1.88 m (6' 2\")          Anesthesia Evaluation     Patient summary reviewed and Nursing notes reviewed    Airway   Mallampati: III  TM distance: >3 FB  Neck ROM: full  Dental - Dentition appears grossly intact     Pulmonary - negative ROS and normal exam   Cardiovascular - negative ROS and normal exam    Neuro/Psych    (+)  anxiety/panic attacks,        GI/Hepatic/Renal    (+) GERD well controlled    Endo/Other - negative ROS   Abdominal  - normal exam                 Anesthesia Plan:   ASA:  2  Plan:   MAC  Informed Consent Plan and Risks Discussed With:   Patient  Discussed plan with:  Surgeon      I have informed Catalino GIRALDO Rodolfotalisha and/or legal guardian or family member of the nature of the anesthetic plan, benefits, risks including possible dental damage if relevant, major complications, and any alternative forms of anesthetic management.   All of the patient's questions were answered to the best of my ability. The patient desires the anesthetic management as planned.  CIERA LOVE, CRNA  2025 1:47 PM  Present on Admission:  **None**           [1]   Medications Prior to Admission   Medication Sig Dispense Refill Last Dose/Taking    benzonatate 200 MG Oral Cap Take 1 capsule (200 mg total) by mouth 3 (three) times daily as needed for cough. 60 capsule 0 2025 Morning    albuterol 108 (90 Base) MCG/ACT Inhalation Aero Soln Inhale 2 puffs into the lungs every 4 (four) hours as needed for Wheezing or Shortness of Breath (or cough). 1 each 0 Taking As Needed    famotidine 40 MG Oral Tab Take 1 tablet (40 mg total) by mouth 2 (two) times daily as needed for Heartburn. 90 tablet 1 2025    pantoprazole 40 MG Oral Tab EC Take one tablet (40 mg total) by mouth once daily, 30 minutes prior to breakfast. 90 tablet 1 2025    busPIRone 10 MG Oral Tab TAKE 1/2 TO 1 TABLET BY MOUTH TWICE DAILY FOR ANXIETY 180 tablet 1 2025    fluticasone-salmeterol (WIXELA INHUB) 250-50 MCG/ACT Inhalation Aerosol Powder, Breath Activated Inhale 1 puff into the lungs 2 (two) times daily. Rinse mouth after use 1 each 3 Taking    valACYclovir (VALTREX) 1 G Oral Tab 2 grams twice day for 1 day 12 tablet 3 Taking    [] PEG 3350-KCl-NaBcb-NaCl-NaSulf (PEG-3350/ELECTROLYTES) 236 g Oral Recon Soln Take 4 L by mouth one time for 1 dose. 1 each 0     Imiquimod 5 % External Cream Apply 1 Application topically 3 (three) times a week. 24 each 2    [2]   Current Facility-Administered Medications Ordered in Epic   Medication Dose Route Frequency Provider Last Rate Last Admin     lactated ringers infusion   Intravenous Continuous Delta Toledo MD 20 mL/hr at 02/17/25 1313 New Bag at 02/17/25 1313     No current [facility]-ordered outpatient medications on file.   [3] No Known Allergies

## 2025-02-18 DIAGNOSIS — J41.8 MIXED SIMPLE AND MUCOPURULENT CHRONIC BRONCHITIS (HCC): ICD-10-CM

## 2025-02-18 RX ORDER — SODIUM CHLORIDE, SODIUM LACTATE, POTASSIUM CHLORIDE, CALCIUM CHLORIDE 600; 310; 30; 20 MG/100ML; MG/100ML; MG/100ML; MG/100ML
INJECTION, SOLUTION INTRAVENOUS CONTINUOUS
Status: ACTIVE | OUTPATIENT
Start: 2025-02-18

## 2025-02-19 RX ORDER — ALBUTEROL SULFATE 90 UG/1
2 INHALANT RESPIRATORY (INHALATION) EVERY 4 HOURS PRN
Qty: 8.5 G | Refills: 0 | Status: SHIPPED | OUTPATIENT
Start: 2025-02-19

## 2025-02-19 NOTE — TELEPHONE ENCOUNTER
Requested Prescriptions     Pending Prescriptions Disp Refills    ALBUTEROL 108 (90 Base) MCG/ACT Inhalation Aero Soln [Pharmacy Med Name: ALBUTEROL HFA INH (200 PUFFS) 8.5GM] 8.5 g 0     Sig: INHALE 2 PUFFS INTO THE LUNGS EVERY 4 HOURS AS NEEDED FOR WHEEZING OR SHORTNESS OF BREATH OR COUGH       Last Refill: 1/13/25    Last OV: 11/26/24    Please review and advise.

## 2025-02-21 ENCOUNTER — TELEPHONE (OUTPATIENT)
Facility: CLINIC | Age: 50
End: 2025-02-21

## 2025-02-21 DIAGNOSIS — K21.9 GASTROESOPHAGEAL REFLUX DISEASE, UNSPECIFIED WHETHER ESOPHAGITIS PRESENT: ICD-10-CM

## 2025-02-21 DIAGNOSIS — R14.0 ABDOMINAL BLOATING: Primary | ICD-10-CM

## 2025-02-26 NOTE — TELEPHONE ENCOUNTER
Dr Toledo,    I spoke to the patient and he would like to schedule an EGD    He said you and him spoke about the EGD the day of his colonoscopy

## 2025-02-28 NOTE — TELEPHONE ENCOUNTER
Thanks Luis yes absolutely.  Former tobacco smoker    GI schedulers, please call Mr. Merino to schedule EGD (stomach endoscopy) exam at [facility] ([facility])    BMI Readings from Last 1 Encounters:   02/17/25 29.53 kg/m²     MAC anesthesia    DX = GERD symptoms refractory to PPI medication, abdominal bloating    Medication instructions:    None

## 2025-02-28 NOTE — TELEPHONE ENCOUNTER
Scheduled for:  EGD [id number]  Provider Name:     Date:  Friday, 08/15/2025   Location:  [facility]  Sedation:  MAC  Time:  3 PM (pt is aware ENDO will call with arrival time       Prep:  NPO  Meds/Allergies Reconciled?:  YES    Diagnosis with codes:  GERD symptoms K21.9 refractory to PPI medication, abdominal bloating R14.0  Was patient informed to call insurance with codes (Y/N):  YES     Referral sent?:  Referral was sent at the time of electronic surgical scheduling.    EM or [facility] notified?:  I sent an electronic request to Endo Scheduling and received a confirmation today.       Medication Orders:  NONE  Misc Orders:  NONE     Further instructions given by staff:  I discussed the prep instructions with the patient which he verbally understood and is aware that I will send the instructions today.via [employer]

## 2025-03-11 ENCOUNTER — TELEPHONE (OUTPATIENT)
Facility: CLINIC | Age: 50
End: 2025-03-11

## 2025-03-11 NOTE — TELEPHONE ENCOUNTER
Recall colon in 5 years per Dr Toledo Colon done 02/17/2025.    Health maintenance updated and message sent to pt outreach to repeat colonoscopy in 5 years

## 2025-03-11 NOTE — TELEPHONE ENCOUNTER
----- Message from Delta Toledo sent at 3/9/2025  5:33 PM CDT -----  GI RNs - please recall for colonoscopy exam in 5yrs

## 2025-06-02 DIAGNOSIS — R19.8 PEPTIC ULCER SYMPTOMS: ICD-10-CM

## 2025-06-03 RX ORDER — PANTOPRAZOLE SODIUM 40 MG/1
40 TABLET, DELAYED RELEASE ORAL DAILY
Qty: 30 TABLET | Refills: 0 | Status: SHIPPED | OUTPATIENT
Start: 2025-06-03

## 2025-06-03 NOTE — TELEPHONE ENCOUNTER
Requested Prescriptions     Pending Prescriptions Disp Refills    PANTOPRAZOLE 40 MG Oral Tab EC [Pharmacy Med Name: PANTOPRAZOLE 40MG TABLETS] 30 tablet 0     Sig: TAKE 1 TABLET(40 MG) BY MOUTH DAILY 30 MINUTES BEFORE BREAKFAST       Last Refill: 11/26/24    Last OV: 11/26/24    Next OV:

## 2025-07-02 DIAGNOSIS — A63.0 GENITAL WARTS: ICD-10-CM

## 2025-07-02 NOTE — TELEPHONE ENCOUNTER
Requested Prescriptions     Pending Prescriptions Disp Refills    IMIQUIMOD 5 % External Cream [Pharmacy Med Name: IMIQUIMOD 5% CREAM SINGLE USE PKTS] 24 each 2     Sig: APPLY EXTERNALLY TO THE AFFECTED AREA 3 DAYS A WEEK       Last Refill: 11/27/24    Last OV: 11/26/24    PSR: Please call patient to schedule Annual Wellness Visit.

## 2025-07-03 RX ORDER — IMIQUIMOD 12.5 MG/.25G
CREAM TOPICAL
Qty: 24 EACH | Refills: 2 | Status: SHIPPED | OUTPATIENT
Start: 2025-07-03

## 2025-08-05 ENCOUNTER — TELEPHONE (OUTPATIENT)
Facility: CLINIC | Age: 50
End: 2025-08-05

## 2025-08-07 ENCOUNTER — TELEPHONE (OUTPATIENT)
Facility: CLINIC | Age: 50
End: 2025-08-07

## 2025-08-25 DIAGNOSIS — B00.1 RECURRENT COLD SORES: ICD-10-CM

## 2025-08-26 RX ORDER — FAMOTIDINE 40 MG/1
40 TABLET, FILM COATED ORAL 2 TIMES DAILY PRN
Qty: 90 TABLET | Refills: 1 | Status: SHIPPED | OUTPATIENT
Start: 2025-08-26

## 2025-08-26 RX ORDER — VALACYCLOVIR HYDROCHLORIDE 1 G/1
TABLET, FILM COATED ORAL
Qty: 12 TABLET | Refills: 3 | Status: SHIPPED | OUTPATIENT
Start: 2025-08-26

## (undated) DEVICE — V2 SPECIMEN COLLECTION TRAY: Brand: NEPTUNE

## (undated) DEVICE — V2 SPECIMEN COLLECTION MANIFOLD KIT: Brand: NEPTUNE

## (undated) DEVICE — 60 ML SYRINGE REGULAR TIP: Brand: MONOJECT

## (undated) DEVICE — Device

## (undated) DEVICE — KIT ENDO ORCAPOD 160/180/190

## (undated) DEVICE — STERILE LATEX POWDER-FREE SURGICAL GLOVESWITH NITRILE COATING: Brand: PROTEXIS

## (undated) DEVICE — MEDI-VAC NON-CONDUCTIVE SUCTION TUBING 6MM X 1.8M (6FT.) L: Brand: CARDINAL HEALTH

## (undated) DEVICE — KIT CLEAN ENDOKIT 1.1OZ GOWNX2

## (undated) DEVICE — LASSO POLYPECTOMY SNARE: Brand: LASSO

## (undated) NOTE — LETTER
6/10/2024    Catalino Merino  [address] [address] [address]    Dear Catalino,    We would like to inform you that your account has been charged $40 for not showing up to the office for your scheduled appointment on 6/10/2024.    Our no-show policy is as follows: A 24-hour notice is required, or you may be charged a $40 No Show fee.      If you are unable to keep your scheduled appointment, please notify us at least 24 hours in advance so we can accommodate our other patients. You may also reschedule your appointment at that time.    On the third no-show, within a 12-month period, it will be the physician’s discretion as to whether a discharge letter will be sent out disengaging you from the practice and giving you 30 days to enroll with a new non [facility] physician.    If you would like to contest this charge, please call [phone].    Sincerely,  [facility]

## (undated) NOTE — LETTER
[facility]  [address], [place], [place]    Authorization for Surgical Operation and Procedure                               I hereby authorize Delta Toledo MD, my physician and his/her assistants (if applicable), which may include medical students, residents, and/or fellows, to perform the following surgical operation/ procedure and administer such anesthesia as may be determined necessary by my physician: Operation/Procedure name (s) COLONOSCOPY on Catalino Merino   2.   I recognize that during the surgical operation/procedure, unforeseen conditions may necessitate additional or different procedures than those listed above.  I, therefore, further authorize and request that the above-named surgeon, assistants, or designees perform such procedures as are, in their judgment, necessary and desirable.    3.   My surgeon/physician has discussed prior to my surgery the potential benefits, risks and side effects of this procedure; the likelihood of achieving goals; and potential problems that might occur during recuperation.  They also discussed reasonable alternatives to the procedure, including risks, benefits, and side effects related to the alternatives and risks related to not receiving this procedure.  I have had all my questions answered and I acknowledge that no guarantee has been made as to the result that may be obtained.    4.   Should the need arise during my operation/procedure, which includes change of level of care prior to discharge, I also consent to the administration of blood and/or blood products.  Further, I understand that despite careful testing and screening of blood or blood products by collecting agencies, I may still be subject to ill effects as a result of receiving a blood transfusion and/or blood products.  The following are some, but not all, of the potential risks that can occur: fever and allergic reactions, hemolytic reactions, transmission of diseases  such as Hepatitis, AIDS and Cytomegalovirus (CMV) and fluid overload.  In the event that I wish to have an autologous transfusion of my own blood, or a directed donor transfusion, I will discuss this with my physician.  Check only if Refusing Blood or Blood Products  I understand refusal of blood or blood products as deemed necessary by my physician may have serious consequences to my condition to include possible death. I hereby assume responsibility for my refusal and release the hospital, its personnel, and my physicians from any responsibility for the consequences of my refusal.    o  Refuse   5.   I authorize the use of any specimen, organs, tissues, body parts or foreign objects that may be removed from my body during the operation/procedure for diagnosis, research or teaching purposes and their subsequent disposal by hospital authorities.  I also authorize the release of specimen test results and/or written reports to my treating physician on the hospital medical staff or other referring or consulting physicians involved in my care, at the discretion of the Pathologist or my treating physician.    6.   I consent to the photographing or videotaping of the operations or procedures to be performed, including appropriate portions of my body for medical, scientific, or educational purposes, provided my identity is not revealed by the pictures or by descriptive texts accompanying them.  If the procedure has been photographed/videotaped, the surgeon will obtain the original picture, image, videotape or CD.  The hospital will not be responsible for storage, release or maintenance of the picture, image, tape or CD.    7.   I consent to the presence of a  or observers in the operating room as deemed necessary by my physician or their designees.    8.   I recognize that in the event my procedure results in extended X-Ray/fluoroscopy time, I may develop a skin reaction.    9. If I have a Do Not Attempt  Resuscitation (DNAR) order in place, that status will be suspended while in the operating room, procedural suite, and during the recovery period unless otherwise explicitly stated by me (or a person authorized to consent on my behalf). The surgeon or my attending physician will determine when the applicable recovery period ends for purposes of reinstating the DNAR order.  10. Patients having a sterilization procedure: I understand that if the procedure is successful the results will be permanent and it will therefore be impossible for me to inseminate, conceive, or bear children.  I also understand that the procedure is intended to result in sterility, although the result has not been guaranteed.   11. I acknowledge that my physician has explained sedation/analgesia administration to me including the risk and benefits I consent to the administration of sedation/analgesia as may be necessary or desirable in the judgment of my physician.    I CERTIFY THAT I HAVE READ AND FULLY UNDERSTAND THE ABOVE CONSENT TO OPERATION and/or OTHER PROCEDURE.     ____________________________________  _________________________________        ______________________________  Signature of Patient    Signature of Responsible Person                Printed Name of Responsible Person                                      ____________________________________  _____________________________                ________________________________  Signature of Witness        Date  Time         Relationship to Patient    STATEMENT OF PHYSICIAN My signature below affirms that prior to the time of the procedure; I have explained to the patient and/or his/her legal representative, the risks and benefits involved in the proposed treatment and any reasonable alternative to the proposed treatment. I have also explained the risks and benefits involved in refusal of the proposed treatment and alternatives to the proposed treatment and have answered the patient's  questions. If I have a significant financial interest in a co-management agreement or a significant financial interest in any product or implant, or other significant relationship used in this procedure/surgery, I have disclosed this and had a discussion with my patient.     _____________________________________________________              _____________________________  (Signature of Physician)                                                                                         (Date)                                   (Time)  Patient Name: Catalino Merino      : 12/3/1975      Printed: 2025     Medical Record #: [id number]                                      Page 1 of 1

## (undated) NOTE — LETTER
[facility]  Administration of Anesthesia  Catalino BONILLA agree to be cared for by a physician anesthesiologist alone and/or with a nurse anesthetist, who is specially trained to monitor me and give me medicine to put me to sleep or keep me comfortable during my procedure    I understand that my anesthesiologist and/or anesthetist is not an employee or agent of [facility] or [employer]. He or she works for [facility]    As the patient asking for anesthesia services, I agree to:  Allow the anesthesiologist (anesthesia doctor) to give me medicine and do additional procedures as necessary. Some examples are: Starting or using an “IV” to give me medicine, fluids or blood during my procedure, and having a breathing tube placed to help me breathe when I’m asleep (intubation). In the event that my heart stops working properly, I understand that my anesthesiologist will make every effort to sustain my life, unless otherwise directed by [facility] Do Not Resuscitate documents.  Tell my anesthesia doctor before my procedure:  If I am pregnant.  The last time that I ate or drank.  iii. All of the medicines I take (including prescriptions, herbal supplements, and pills I can buy without a prescription (including street drugs/illegal medications). Failure to inform my anesthesiologist about these medicines may increase my risk of anesthetic complications.  iv.If I am allergic to anything or have had a reaction to anesthesia before.  I understand how the anesthesia medicine will help me (benefits).  I understand that with any type of anesthesia medicine there are risks:  The most common risks are: nausea, vomiting, sore throat, muscle soreness, damage to my eyes, mouth, or teeth (from breathing tube placement).  Rare risks include: remembering what happened during my procedure, allergic reactions to medications, injury to my airway, heart, lungs, vision, nerves, or  muscles and in extremely rare instances death.  My doctor has explained to me other choices available to me for my care (alternatives).  Pregnant Patients (“epidural”):  I understand that the risks of having an epidural (medicine given into my back to help control pain during labor), include itching, low blood pressure, difficulty urinating, headache or slowing of the baby’s heart. Very rare risks include infection, bleeding, seizure, irregular heart rhythms and nerve injury.  Regional Anesthesia (“spinal”, “epidural”, & “nerve blocks”):  I understand that rare but potential complications include headache, bleeding, infection, seizure, irregular heart rhythms, and nerve injury.    _____________________________________________________________________________  Patient (or Representative) Signature/Relationship to Patient  Date   Time    _____________________________________________________________________________   Name (if used)    Language/Organization   Time    _____________________________________________________________________________  Nurse Anesthetist Signature     Date   Time  _____________________________________________________________________________  Anesthesiologist Signature     Date   Time  I have discussed the procedure and information above with the patient (or patient’s representative) and answered their questions. The patient or their representative has agreed to have anesthesia services.    _____________________________________________________________________________  Witness        Date   Time  I have verified that the signature is that of the patient or patient’s representative, and that it was signed before the procedure  Patient Name: Catalino Merino     : 12/3/1975                 Printed: 2025 at 10:46 AM    Medical Record #: [id number]                                            Page 1 of 1  ----------ANESTHESIA CONSENT----------

## (undated) NOTE — LETTER
03/12/21        Catalino Merino  [address]      Dear Radha Garcia,    [address] [facility] records indicate that you have outstanding lab work and or testing that was ordered for you and has not yet been completed:  Orders Placed This Encounter      [facility]